# Patient Record
Sex: FEMALE | Race: WHITE | NOT HISPANIC OR LATINO | Employment: OTHER | URBAN - METROPOLITAN AREA
[De-identification: names, ages, dates, MRNs, and addresses within clinical notes are randomized per-mention and may not be internally consistent; named-entity substitution may affect disease eponyms.]

---

## 2017-02-15 RX ORDER — THYROID 60 MG
TABLET ORAL
Qty: 90 TABLET | Refills: 0 | OUTPATIENT
Start: 2017-02-15

## 2017-02-27 RX ORDER — THYROID 60 MG
TABLET ORAL
Qty: 90 TABLET | Refills: 0 | OUTPATIENT
Start: 2017-02-27

## 2017-04-03 ENCOUNTER — OFFICE VISIT (OUTPATIENT)
Dept: INTERNAL MEDICINE | Facility: CLINIC | Age: 62
End: 2017-04-03

## 2017-04-03 VITALS
OXYGEN SATURATION: 98 % | HEIGHT: 65 IN | SYSTOLIC BLOOD PRESSURE: 118 MMHG | BODY MASS INDEX: 29.4 KG/M2 | DIASTOLIC BLOOD PRESSURE: 78 MMHG | WEIGHT: 176.44 LBS | HEART RATE: 81 BPM

## 2017-04-03 DIAGNOSIS — D50.9 IRON DEFICIENCY ANEMIA, UNSPECIFIED IRON DEFICIENCY ANEMIA TYPE: ICD-10-CM

## 2017-04-03 DIAGNOSIS — B00.9 HERPES SIMPLEX: Primary | ICD-10-CM

## 2017-04-03 DIAGNOSIS — Z00.00 HEALTHCARE MAINTENANCE: ICD-10-CM

## 2017-04-03 DIAGNOSIS — E03.9 ACQUIRED HYPOTHYROIDISM: ICD-10-CM

## 2017-04-03 PROBLEM — E61.1 IRON DEFICIENCY: Status: ACTIVE | Noted: 2017-04-03

## 2017-04-03 PROBLEM — N63.0 BREAST LUMP: Status: ACTIVE | Noted: 2017-04-03

## 2017-04-03 PROBLEM — M50.30 DDD (DEGENERATIVE DISC DISEASE), CERVICAL: Status: ACTIVE | Noted: 2017-04-03

## 2017-04-03 PROBLEM — M54.10 NERVE ROOT PAIN: Status: ACTIVE | Noted: 2017-04-03

## 2017-04-03 PROBLEM — M54.2 CERVICAL PAIN: Status: ACTIVE | Noted: 2017-04-03

## 2017-04-03 PROBLEM — R22.9 LOCAL SUPERFICIAL SWELLING, MASS OR LUMP: Status: ACTIVE | Noted: 2017-04-03

## 2017-04-03 PROBLEM — J30.9 ALLERGIC RHINITIS: Status: ACTIVE | Noted: 2017-04-03

## 2017-04-03 PROBLEM — G25.81 RESTLESS LEG: Status: ACTIVE | Noted: 2017-04-03

## 2017-04-03 PROBLEM — R53.83 FATIGUE: Status: ACTIVE | Noted: 2017-04-03

## 2017-04-03 PROBLEM — E66.9 ADIPOSITY: Status: ACTIVE | Noted: 2017-04-03

## 2017-04-03 PROBLEM — E53.8 B12 DEFICIENCY: Status: ACTIVE | Noted: 2017-04-03

## 2017-04-03 PROBLEM — F41.9 ANXIETY: Status: ACTIVE | Noted: 2017-04-03

## 2017-04-03 PROBLEM — M25.519 ARTHRALGIA OF SHOULDER: Status: ACTIVE | Noted: 2017-04-03

## 2017-04-03 PROCEDURE — 99396 PREV VISIT EST AGE 40-64: CPT | Performed by: NURSE PRACTITIONER

## 2017-04-03 RX ORDER — VALACYCLOVIR HYDROCHLORIDE 1 G/1
2000 TABLET, FILM COATED ORAL EVERY 12 HOURS
Qty: 4 TABLET | Refills: 6 | Status: SHIPPED | OUTPATIENT
Start: 2017-04-03 | End: 2017-10-20 | Stop reason: SDUPTHER

## 2017-04-03 RX ORDER — ESTROGENS, ESTERIFIED AND METHYLTESTOSTERONE 1.25; .625 MG/1; MG/1
1 TABLET, COATED ORAL DAILY
Refills: 1 | COMMUNITY
Start: 2017-03-13 | End: 2017-10-05 | Stop reason: DRUGHIGH

## 2017-04-03 RX ORDER — GLUCOSAMINE HCL 500 MG
1 TABLET ORAL DAILY
COMMUNITY
End: 2019-02-13

## 2017-04-03 NOTE — PROGRESS NOTES
Subjective   Marychuy De Paz is a 61 y.o. female. Patient is here today for   Chief Complaint   Patient presents with   • Hypothyroidism     Pt here to follow up on Hypothyroidism. Pt currently takes Amour Thyroid 60mg once daily.    • Anemia     Pt here to follow up on anemia, currently taking Ferrocite 324mg once daily. Pt has been dx for the past x8 years.    .    History of Present Illness   New patient here to establish care.  Health history and questionnaire have been reviewed in its entirety. The patient's previous primary care provider was Dr. Payne.  Patient has a diagnosis of hypothyroidism. She was diagnosed about 8 years ago. She is currently on Castle Thyroid 60 mg daily.  She also has a history of anemia and has been on ferrocite 324 mg daily. States that the iron causes constipation, so she may skip taking it every once in a while.     She had a colonoscopy in 2013 by Dr Chamberlain which was normal. She needs another one in 10 years.   She sees Dr. Kaci Goodman for gynecology and had a mammogram in Dec 2016    She would also like a refill on valacyclovir. She does have occasional cold sores.     The following portions of the patient's history were reviewed and updated as appropriate: allergies, current medications, past family history, past medical history, past social history, past surgical history and problem list.    Review of Systems   Constitutional: Negative.    HENT: Negative.    Respiratory: Negative.    Cardiovascular: Negative.    Gastrointestinal: Positive for constipation. Negative for abdominal pain, diarrhea, nausea and vomiting.   Endocrine: Negative.    Genitourinary: Negative.    Musculoskeletal: Negative.    Skin: Negative.    Neurological: Negative.    Hematological: Negative.    Psychiatric/Behavioral: Negative.        Objective   Vitals:    04/03/17 1037   BP: 118/78   Pulse: 81   SpO2: 98%     Physical Exam   Constitutional: She is oriented to person, place, and time. Vital signs are  normal. She appears well-developed and well-nourished.   HENT:   Right Ear: External ear normal.   Left Ear: External ear normal.   Mouth/Throat: Oropharynx is clear and moist.   Eyes: EOM are normal. Pupils are equal, round, and reactive to light.   Neck: Normal range of motion. Neck supple. No thyromegaly present.   Cardiovascular: Normal rate, regular rhythm and normal heart sounds.    Pulmonary/Chest: Effort normal and breath sounds normal.   Abdominal: Soft. Bowel sounds are normal. She exhibits no distension. There is no splenomegaly or hepatomegaly. There is no tenderness. No hernia.   Musculoskeletal: Normal range of motion.   Neurological: She is alert and oriented to person, place, and time. She has normal reflexes.   Skin: Skin is warm and dry.   Psychiatric: She has a normal mood and affect. Her behavior is normal. Judgment and thought content normal.   Nursing note and vitals reviewed.      Assessment/Plan   Marychuy was seen today for hypothyroidism and anemia.    Diagnoses and all orders for this visit:    Herpes simplex  -     valACYclovir (VALTREX) 1000 MG tablet; Take 2 tablets by mouth Every 12 (Twelve) Hours.    Iron deficiency anemia, unspecified iron deficiency anemia type  -     CBC & Differential  -     Iron and TIBC  -     Ferritin    Acquired hypothyroidism  -     TSH  -     T4, Free    Healthcare maintenance  -     CBC & Differential  -     Comprehensive Metabolic Panel  -     Lipid Panel With / Chol / HDL Ratio  -     Vitamin D 25 Hydroxy      Will call patient with lab results. F/u in 6 months.

## 2017-04-04 ENCOUNTER — TELEPHONE (OUTPATIENT)
Dept: INTERNAL MEDICINE | Facility: CLINIC | Age: 62
End: 2017-04-04

## 2017-04-04 LAB
25(OH)D3+25(OH)D2 SERPL-MCNC: 30.3 NG/ML (ref 30–100)
ALBUMIN SERPL-MCNC: 4.3 G/DL (ref 3.5–5.2)
ALBUMIN/GLOB SERPL: 1.8 G/DL
ALP SERPL-CCNC: 53 U/L (ref 39–117)
ALT SERPL-CCNC: 19 U/L (ref 1–33)
AST SERPL-CCNC: 16 U/L (ref 1–32)
BASOPHILS # BLD AUTO: 0.02 10*3/MM3 (ref 0–0.2)
BASOPHILS NFR BLD AUTO: 0.3 % (ref 0–1.5)
BILIRUB SERPL-MCNC: 0.5 MG/DL (ref 0.1–1.2)
BUN SERPL-MCNC: 12 MG/DL (ref 6–20)
BUN/CREAT SERPL: 15.8 (ref 7–25)
CALCIUM SERPL-MCNC: 9.6 MG/DL (ref 8.6–10.5)
CHLORIDE SERPL-SCNC: 104 MMOL/L (ref 98–107)
CHOLEST SERPL-MCNC: 167 MG/DL (ref 0–200)
CHOLEST/HDLC SERPL: 3.04 {RATIO}
CO2 SERPL-SCNC: 25.9 MMOL/L (ref 22–29)
CREAT SERPL-MCNC: 0.76 MG/DL (ref 0.57–1)
EOSINOPHIL # BLD AUTO: 0.02 10*3/MM3 (ref 0–0.7)
EOSINOPHIL NFR BLD AUTO: 0.3 % (ref 0.3–6.2)
ERYTHROCYTE [DISTWIDTH] IN BLOOD BY AUTOMATED COUNT: 14.2 % (ref 11.7–13)
FERRITIN SERPL-MCNC: 32.89 NG/ML (ref 13–150)
GLOBULIN SER CALC-MCNC: 2.4 GM/DL
GLUCOSE SERPL-MCNC: 88 MG/DL (ref 65–99)
HCT VFR BLD AUTO: 48.2 % (ref 35.6–45.5)
HDLC SERPL-MCNC: 55 MG/DL (ref 40–60)
HGB BLD-MCNC: 15.3 G/DL (ref 11.9–15.5)
IMM GRANULOCYTES # BLD: 0 10*3/MM3 (ref 0–0.03)
IMM GRANULOCYTES NFR BLD: 0 % (ref 0–0.5)
IRON SATN MFR SERPL: 72 % (ref 20–50)
IRON SERPL-MCNC: 353 MCG/DL (ref 37–145)
LDLC SERPL CALC-MCNC: 96 MG/DL (ref 0–100)
LYMPHOCYTES # BLD AUTO: 1.84 10*3/MM3 (ref 0.9–4.8)
LYMPHOCYTES NFR BLD AUTO: 31.7 % (ref 19.6–45.3)
MCH RBC QN AUTO: 31.3 PG (ref 26.9–32)
MCHC RBC AUTO-ENTMCNC: 31.7 G/DL (ref 32.4–36.3)
MCV RBC AUTO: 98.6 FL (ref 80.5–98.2)
MONOCYTES # BLD AUTO: 0.66 10*3/MM3 (ref 0.2–1.2)
MONOCYTES NFR BLD AUTO: 11.4 % (ref 5–12)
NEUTROPHILS # BLD AUTO: 3.27 10*3/MM3 (ref 1.9–8.1)
NEUTROPHILS NFR BLD AUTO: 56.3 % (ref 42.7–76)
PLATELET # BLD AUTO: 305 10*3/MM3 (ref 140–500)
POTASSIUM SERPL-SCNC: 4.5 MMOL/L (ref 3.5–5.2)
PROT SERPL-MCNC: 6.7 G/DL (ref 6–8.5)
RBC # BLD AUTO: 4.89 10*6/MM3 (ref 3.9–5.2)
SODIUM SERPL-SCNC: 143 MMOL/L (ref 136–145)
T4 FREE SERPL-MCNC: 1.06 NG/DL (ref 0.93–1.7)
TIBC SERPL-MCNC: 491 MCG/DL
TRIGL SERPL-MCNC: 82 MG/DL (ref 0–150)
TSH SERPL DL<=0.005 MIU/L-ACNC: 1.09 MIU/ML (ref 0.27–4.2)
UIBC SERPL-MCNC: 138 MCG/DL
VLDLC SERPL CALC-MCNC: 16.4 MG/DL (ref 5–40)
WBC # BLD AUTO: 5.81 10*3/MM3 (ref 4.5–10.7)

## 2017-04-04 NOTE — TELEPHONE ENCOUNTER
----- Message from ASHER Coombs sent at 4/4/2017  9:05 AM EDT -----  Please let patient know that her labs are normal, except that her iron is elevated. She can stop her iron supplement and I will recheck a CBC and iron studies in 3 months.

## 2017-04-04 NOTE — TELEPHONE ENCOUNTER
Pt aware of lab results. Pt will stop her iron & call me back to check her iron levels in 3 months.

## 2017-09-12 ENCOUNTER — TRANSCRIBE ORDERS (OUTPATIENT)
Dept: ADMINISTRATIVE | Facility: HOSPITAL | Age: 62
End: 2017-09-12

## 2017-09-12 DIAGNOSIS — Z12.31 VISIT FOR SCREENING MAMMOGRAM: Primary | ICD-10-CM

## 2017-09-28 ENCOUNTER — HOSPITAL ENCOUNTER (EMERGENCY)
Facility: HOSPITAL | Age: 62
Discharge: HOME | End: 2017-09-28
Attending: EMERGENCY MEDICINE | Admitting: EMERGENCY MEDICINE

## 2017-09-28 ENCOUNTER — APPOINTMENT (OUTPATIENT)
Dept: CT IMAGING | Facility: HOSPITAL | Age: 62
End: 2017-09-28

## 2017-09-28 VITALS
BODY MASS INDEX: 29.66 KG/M2 | HEIGHT: 65 IN | WEIGHT: 178 LBS | TEMPERATURE: 98.7 F | HEART RATE: 83 BPM | RESPIRATION RATE: 16 BRPM | OXYGEN SATURATION: 98 % | DIASTOLIC BLOOD PRESSURE: 78 MMHG | SYSTOLIC BLOOD PRESSURE: 127 MMHG

## 2017-09-28 DIAGNOSIS — R10.13 EPIGASTRIC PAIN: ICD-10-CM

## 2017-09-28 DIAGNOSIS — N39.0 ACUTE UTI: Primary | ICD-10-CM

## 2017-09-28 LAB
ALBUMIN SERPL-MCNC: 4.4 G/DL (ref 3.5–5.2)
ALBUMIN/GLOB SERPL: 1.8 G/DL
ALP SERPL-CCNC: 45 U/L (ref 39–117)
ALT SERPL W P-5'-P-CCNC: 24 U/L (ref 1–33)
ANION GAP SERPL CALCULATED.3IONS-SCNC: 12.9 MMOL/L
AST SERPL-CCNC: 23 U/L (ref 1–32)
BACTERIA UR QL AUTO: ABNORMAL /HPF
BASOPHILS # BLD AUTO: 0.01 10*3/MM3 (ref 0–0.2)
BASOPHILS NFR BLD AUTO: 0.1 % (ref 0–1.5)
BILIRUB SERPL-MCNC: 0.6 MG/DL (ref 0.1–1.2)
BILIRUB UR QL STRIP: NEGATIVE
BUN BLD-MCNC: 15 MG/DL (ref 6–20)
BUN/CREAT SERPL: 18.8 (ref 7–25)
CALCIUM SPEC-SCNC: 9.1 MG/DL (ref 8.6–10.5)
CHLORIDE SERPL-SCNC: 102 MMOL/L (ref 98–107)
CLARITY UR: ABNORMAL
CO2 SERPL-SCNC: 24.1 MMOL/L (ref 22–29)
COLOR UR: YELLOW
CREAT BLD-MCNC: 0.8 MG/DL (ref 0.57–1)
DEPRECATED RDW RBC AUTO: 45.7 FL (ref 37–54)
EOSINOPHIL # BLD AUTO: 0.04 10*3/MM3 (ref 0–0.7)
EOSINOPHIL NFR BLD AUTO: 0.3 % (ref 0.3–6.2)
ERYTHROCYTE [DISTWIDTH] IN BLOOD BY AUTOMATED COUNT: 13.1 % (ref 11.7–13)
GFR SERPL CREATININE-BSD FRML MDRD: 73 ML/MIN/1.73
GLOBULIN UR ELPH-MCNC: 2.4 GM/DL
GLUCOSE BLD-MCNC: 105 MG/DL (ref 65–99)
GLUCOSE UR STRIP-MCNC: NEGATIVE MG/DL
HCT VFR BLD AUTO: 46.4 % (ref 35.6–45.5)
HGB BLD-MCNC: 15.4 G/DL (ref 11.9–15.5)
HGB UR QL STRIP.AUTO: NEGATIVE
HOLD SPECIMEN: NORMAL
HOLD SPECIMEN: NORMAL
HYALINE CASTS UR QL AUTO: ABNORMAL /LPF
IMM GRANULOCYTES # BLD: 0.05 10*3/MM3 (ref 0–0.03)
IMM GRANULOCYTES NFR BLD: 0.3 % (ref 0–0.5)
KETONES UR QL STRIP: NEGATIVE
LEUKOCYTE ESTERASE UR QL STRIP.AUTO: ABNORMAL
LIPASE SERPL-CCNC: 29 U/L (ref 13–60)
LYMPHOCYTES # BLD AUTO: 0.83 10*3/MM3 (ref 0.9–4.8)
LYMPHOCYTES NFR BLD AUTO: 5.3 % (ref 19.6–45.3)
MCH RBC QN AUTO: 32 PG (ref 26.9–32)
MCHC RBC AUTO-ENTMCNC: 33.2 G/DL (ref 32.4–36.3)
MCV RBC AUTO: 96.3 FL (ref 80.5–98.2)
MONOCYTES # BLD AUTO: 1.97 10*3/MM3 (ref 0.2–1.2)
MONOCYTES NFR BLD AUTO: 12.5 % (ref 5–12)
NEUTROPHILS # BLD AUTO: 12.86 10*3/MM3 (ref 1.9–8.1)
NEUTROPHILS NFR BLD AUTO: 81.5 % (ref 42.7–76)
NITRITE UR QL STRIP: NEGATIVE
PH UR STRIP.AUTO: 6.5 [PH] (ref 5–8)
PLATELET # BLD AUTO: 255 10*3/MM3 (ref 140–500)
PMV BLD AUTO: 9.7 FL (ref 6–12)
POTASSIUM BLD-SCNC: 4.1 MMOL/L (ref 3.5–5.2)
PROT SERPL-MCNC: 6.8 G/DL (ref 6–8.5)
PROT UR QL STRIP: NEGATIVE
RBC # BLD AUTO: 4.82 10*6/MM3 (ref 3.9–5.2)
RBC # UR: ABNORMAL /HPF
REF LAB TEST METHOD: ABNORMAL
SODIUM BLD-SCNC: 139 MMOL/L (ref 136–145)
SP GR UR STRIP: 1.02 (ref 1–1.03)
SQUAMOUS #/AREA URNS HPF: ABNORMAL /HPF
UROBILINOGEN UR QL STRIP: ABNORMAL
WBC NRBC COR # BLD: 15.76 10*3/MM3 (ref 4.5–10.7)
WBC UR QL AUTO: ABNORMAL /HPF
WHOLE BLOOD HOLD SPECIMEN: NORMAL
WHOLE BLOOD HOLD SPECIMEN: NORMAL

## 2017-09-28 PROCEDURE — 74177 CT ABD & PELVIS W/CONTRAST: CPT

## 2017-09-28 PROCEDURE — 87086 URINE CULTURE/COLONY COUNT: CPT | Performed by: EMERGENCY MEDICINE

## 2017-09-28 PROCEDURE — 96361 HYDRATE IV INFUSION ADD-ON: CPT

## 2017-09-28 PROCEDURE — 83690 ASSAY OF LIPASE: CPT | Performed by: EMERGENCY MEDICINE

## 2017-09-28 PROCEDURE — 96374 THER/PROPH/DIAG INJ IV PUSH: CPT

## 2017-09-28 PROCEDURE — 25010000002 ONDANSETRON PER 1 MG: Performed by: NURSE PRACTITIONER

## 2017-09-28 PROCEDURE — 99284 EMERGENCY DEPT VISIT MOD MDM: CPT

## 2017-09-28 PROCEDURE — 81001 URINALYSIS AUTO W/SCOPE: CPT | Performed by: EMERGENCY MEDICINE

## 2017-09-28 PROCEDURE — 85025 COMPLETE CBC W/AUTO DIFF WBC: CPT | Performed by: EMERGENCY MEDICINE

## 2017-09-28 PROCEDURE — 36415 COLL VENOUS BLD VENIPUNCTURE: CPT | Performed by: EMERGENCY MEDICINE

## 2017-09-28 PROCEDURE — 0 IOPAMIDOL 61 % SOLUTION: Performed by: NURSE PRACTITIONER

## 2017-09-28 PROCEDURE — 80053 COMPREHEN METABOLIC PANEL: CPT | Performed by: EMERGENCY MEDICINE

## 2017-09-28 RX ORDER — SULFAMETHOXAZOLE AND TRIMETHOPRIM 800; 160 MG/1; MG/1
1 TABLET ORAL 2 TIMES DAILY
Qty: 10 TABLET | Refills: 0 | Status: SHIPPED | OUTPATIENT
Start: 2017-09-28 | End: 2017-10-05 | Stop reason: DRUGHIGH

## 2017-09-28 RX ORDER — SODIUM CHLORIDE 0.9 % (FLUSH) 0.9 %
10 SYRINGE (ML) INJECTION AS NEEDED
Status: DISCONTINUED | OUTPATIENT
Start: 2017-09-28 | End: 2017-09-28 | Stop reason: HOSPADM

## 2017-09-28 RX ORDER — ONDANSETRON 2 MG/ML
4 INJECTION INTRAMUSCULAR; INTRAVENOUS ONCE
Status: COMPLETED | OUTPATIENT
Start: 2017-09-28 | End: 2017-09-28

## 2017-09-28 RX ADMIN — ONDANSETRON 4 MG: 2 INJECTION INTRAMUSCULAR; INTRAVENOUS at 17:16

## 2017-09-28 RX ADMIN — IOPAMIDOL 85 ML: 612 INJECTION, SOLUTION INTRAVENOUS at 17:33

## 2017-09-28 RX ADMIN — SODIUM CHLORIDE 1000 ML: 9 INJECTION, SOLUTION INTRAVENOUS at 17:13

## 2017-09-29 LAB
BACTERIA SPEC AEROBE CULT: NORMAL
BACTERIA SPEC AEROBE CULT: NORMAL

## 2017-10-05 ENCOUNTER — OFFICE VISIT (OUTPATIENT)
Dept: INTERNAL MEDICINE | Facility: CLINIC | Age: 62
End: 2017-10-05

## 2017-10-05 VITALS
SYSTOLIC BLOOD PRESSURE: 122 MMHG | DIASTOLIC BLOOD PRESSURE: 80 MMHG | WEIGHT: 177.44 LBS | BODY MASS INDEX: 29.56 KG/M2 | HEIGHT: 65 IN | OXYGEN SATURATION: 97 % | HEART RATE: 91 BPM

## 2017-10-05 DIAGNOSIS — E03.9 ACQUIRED HYPOTHYROIDISM: ICD-10-CM

## 2017-10-05 DIAGNOSIS — R39.15 URGENCY OF URINATION: Primary | ICD-10-CM

## 2017-10-05 LAB
BILIRUB BLD-MCNC: NEGATIVE MG/DL
CLARITY, POC: CLEAR
COLOR UR: ABNORMAL
GLUCOSE UR STRIP-MCNC: NEGATIVE MG/DL
KETONES UR QL: NEGATIVE
LEUKOCYTE EST, POC: ABNORMAL
NITRITE UR-MCNC: NEGATIVE MG/ML
PH UR: 5.5 [PH] (ref 5–8)
PROT UR STRIP-MCNC: NEGATIVE MG/DL
RBC # UR STRIP: ABNORMAL /UL
SP GR UR: 1.02 (ref 1–1.03)
UROBILINOGEN UR QL: NORMAL

## 2017-10-05 PROCEDURE — 99213 OFFICE O/P EST LOW 20 MIN: CPT | Performed by: NURSE PRACTITIONER

## 2017-10-05 PROCEDURE — 81003 URINALYSIS AUTO W/O SCOPE: CPT | Performed by: NURSE PRACTITIONER

## 2017-10-05 RX ORDER — ESTROGENS, ESTERIFIED AND METHYLTESTOSTERONE 2.5; 1.25 MG/1; MG/1
1 TABLET, COATED ORAL DAILY
Refills: 1 | COMMUNITY
Start: 2017-08-30 | End: 2020-09-08

## 2017-10-05 NOTE — PROGRESS NOTES
Subjective   Marychuy De Paz is a 61 y.o. female. Patient is here today for   Chief Complaint   Patient presents with   • Urinary Tract Infection     Pt here to follow up on ER visit for a UTI. Pt went in initiall for vomiting & mild epigastric pain.    .    History of Present Illness   Patient is here to follow up on UTI that was diagnosed at the ER on 9/28/17. Patient had presented with epigastric pain and she was concerned about her previous hiatal hernia repair.  Patient had a CT scan which was normal.  Patient's urine did show signs of an infection and her WBC count was elevated, so she was prescribed Bactrim. Patient states that she is feeling better.  Patient does have urinary frequency, but states that that is nothing new.  Patient has had 2 different bladder surgeries and sees Dr. Kaci Goodman who is recommending another surgery.  Dr. Goodman was not the one that had done her previous 2 surgeries.  Patient states that the Bactrim did cause her to have leg cramps and those have subsided since being done with the Bactrim. Denies burning with urination or dysuria.  Patient would like to have her white blood cell count rechecked and is wondering if she can have her TSH checked since it has been about 6 months since her last lab draw.    The following portions of the patient's history were reviewed and updated as appropriate: allergies, current medications, past family history, past medical history, past social history, past surgical history and problem list.    Review of Systems   Constitutional: Negative.    Respiratory: Negative.    Cardiovascular: Negative.    Gastrointestinal: Negative.    Genitourinary: Positive for frequency. Negative for dysuria and urgency.       Objective   Vitals:    10/05/17 1404   BP: 122/80   Pulse: 91   SpO2: 97%       Results for orders placed or performed in visit on 10/05/17   POCT urinalysis dipstick, automated   Result Value Ref Range    Color Dark Yellow Yellow, Straw, Dark Yellow,  Ellen    Clarity, UA Clear Clear    Glucose, UA Negative Negative, 1000 mg/dL (3+) mg/dL    Bilirubin Negative Negative    Ketones, UA Negative Negative    Specific Gravity  1.020 1.005 - 1.030    Blood, UA Trace (A) Negative    pH, Urine 5.5 5.0 - 8.0    Protein, POC Negative Negative mg/dL    Urobilinogen, UA Normal Normal    Leukocytes Small (1+) (A) Negative    Nitrite, UA Negative Negative       Physical Exam   Constitutional: Vital signs are normal. She appears well-developed and well-nourished.   Cardiovascular: Normal rate, regular rhythm and normal heart sounds.    Pulmonary/Chest: Effort normal and breath sounds normal.   Abdominal: Soft. Normal appearance and bowel sounds are normal. There is no tenderness. There is no CVA tenderness.   Skin: Skin is warm, dry and intact.   Psychiatric: She has a normal mood and affect. Her speech is normal and behavior is normal. Thought content normal.   Nursing note and vitals reviewed.      Assessment/Plan   Marychuy was seen today for urinary tract infection.    Diagnoses and all orders for this visit:    Urgency of urination  -     CBC & Differential  -     POCT urinalysis dipstick, automated  -     Urine Culture - Urine, Urine, Clean Catch    Acquired hypothyroidism  -     TSH Rfx On Abnormal To Free T4    Will wait for urine culture results prior to sending in any further antibiotics to patient.

## 2017-10-06 ENCOUNTER — TELEPHONE (OUTPATIENT)
Dept: SOCIAL WORK | Facility: HOSPITAL | Age: 62
End: 2017-10-06

## 2017-10-06 LAB
BASOPHILS # BLD AUTO: 0.01 10*3/MM3 (ref 0–0.2)
BASOPHILS NFR BLD AUTO: 0.2 % (ref 0–1.5)
EOSINOPHIL # BLD AUTO: 0.03 10*3/MM3 (ref 0–0.7)
EOSINOPHIL NFR BLD AUTO: 0.5 % (ref 0.3–6.2)
ERYTHROCYTE [DISTWIDTH] IN BLOOD BY AUTOMATED COUNT: 13.2 % (ref 11.7–13)
HCT VFR BLD AUTO: 49.1 % (ref 35.6–45.5)
HGB BLD-MCNC: 15.8 G/DL (ref 11.9–15.5)
IMM GRANULOCYTES # BLD: 0 10*3/MM3 (ref 0–0.03)
IMM GRANULOCYTES NFR BLD: 0 % (ref 0–0.5)
LYMPHOCYTES # BLD AUTO: 1.89 10*3/MM3 (ref 0.9–4.8)
LYMPHOCYTES NFR BLD AUTO: 29.6 % (ref 19.6–45.3)
MCH RBC QN AUTO: 31.7 PG (ref 26.9–32)
MCHC RBC AUTO-ENTMCNC: 32.2 G/DL (ref 32.4–36.3)
MCV RBC AUTO: 98.4 FL (ref 80.5–98.2)
MONOCYTES # BLD AUTO: 0.78 10*3/MM3 (ref 0.2–1.2)
MONOCYTES NFR BLD AUTO: 12.2 % (ref 5–12)
NEUTROPHILS # BLD AUTO: 3.67 10*3/MM3 (ref 1.9–8.1)
NEUTROPHILS NFR BLD AUTO: 57.5 % (ref 42.7–76)
PLATELET # BLD AUTO: 299 10*3/MM3 (ref 140–500)
RBC # BLD AUTO: 4.99 10*6/MM3 (ref 3.9–5.2)
TSH SERPL DL<=0.005 MIU/L-ACNC: 1.25 MIU/ML (ref 0.27–4.2)
WBC # BLD AUTO: 6.38 10*3/MM3 (ref 4.5–10.7)

## 2017-10-06 NOTE — TELEPHONE ENCOUNTER
ER F/U phone call:   Pt stated that she is doing some better. Saw her PCP on 10-5-17, another ua/ c&s completed. MD d/c'd current antibiotic (sulfa) due to severe leg cramps. (added to allergy list) PCP awaiting results of culture before ordering any other antibiotics. No other questions or concerns voiced at this time. Kaci Blake RN

## 2017-10-07 LAB
BACTERIA UR CULT: NORMAL
BACTERIA UR CULT: NORMAL

## 2017-10-09 ENCOUNTER — TELEPHONE (OUTPATIENT)
Dept: INTERNAL MEDICINE | Facility: CLINIC | Age: 62
End: 2017-10-09

## 2017-10-09 DIAGNOSIS — M54.9 BACK PAIN, UNSPECIFIED BACK LOCATION, UNSPECIFIED BACK PAIN LATERALITY, UNSPECIFIED CHRONICITY: Primary | ICD-10-CM

## 2017-10-09 NOTE — TELEPHONE ENCOUNTER
"----- Message from ASHER Coombs sent at 10/9/2017  4:36 PM EDT -----  Ok for thoracic and lumbar spine x-rays. Dx: back pain.   She had an MRI of her cervical spine 3 years ago and there was no scoliosis.     ----- Message -----     From: Cherelle Wilkinson MA     Sent: 10/9/2017   1:41 PM       To: ASHER Coombs    Pt states that she is feeling better urinary wise but she is not feeling \"right\" & thinks she has scoliosis just like her mother because she finds herself shrinking. 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 Please advise.    "

## 2017-10-10 ENCOUNTER — HOSPITAL ENCOUNTER (OUTPATIENT)
Dept: GENERAL RADIOLOGY | Facility: HOSPITAL | Age: 62
Discharge: HOME | End: 2017-10-10
Admitting: NURSE PRACTITIONER

## 2017-10-10 ENCOUNTER — HOSPITAL ENCOUNTER (OUTPATIENT)
Dept: GENERAL RADIOLOGY | Facility: HOSPITAL | Age: 62
Discharge: HOME | End: 2017-10-10

## 2017-10-10 DIAGNOSIS — M54.9 BACK PAIN, UNSPECIFIED BACK LOCATION, UNSPECIFIED BACK PAIN LATERALITY, UNSPECIFIED CHRONICITY: Primary | ICD-10-CM

## 2017-10-10 PROCEDURE — 72110 X-RAY EXAM L-2 SPINE 4/>VWS: CPT

## 2017-10-10 PROCEDURE — 72072 X-RAY EXAM THORAC SPINE 3VWS: CPT

## 2017-10-12 ENCOUNTER — TELEPHONE (OUTPATIENT)
Dept: INTERNAL MEDICINE | Facility: CLINIC | Age: 62
End: 2017-10-12

## 2017-10-12 NOTE — TELEPHONE ENCOUNTER
----- Message from ASHER Coombs sent at 10/12/2017  1:12 PM EDT -----  Please let patient know that she does have some scoliosis in her thoracic and lumbar spine. Has she seen a back doctor in the past?

## 2017-10-13 NOTE — TELEPHONE ENCOUNTER
Pt aware of x-ray results. Pt is going to get into her acupuncture dr & see if this will work for treatment.

## 2017-10-20 DIAGNOSIS — B00.9 HERPES SIMPLEX: ICD-10-CM

## 2017-10-23 RX ORDER — VALACYCLOVIR HYDROCHLORIDE 1 G/1
TABLET, FILM COATED ORAL
Qty: 4 TABLET | Refills: 0 | Status: SHIPPED | OUTPATIENT
Start: 2017-10-23 | End: 2018-05-22 | Stop reason: SDUPTHER

## 2017-10-30 ENCOUNTER — HOSPITAL ENCOUNTER (OUTPATIENT)
Dept: MAMMOGRAPHY | Facility: HOSPITAL | Age: 62
Discharge: HOME | End: 2017-10-30
Admitting: NURSE PRACTITIONER

## 2017-10-30 DIAGNOSIS — Z12.31 VISIT FOR SCREENING MAMMOGRAM: ICD-10-CM

## 2017-10-30 PROCEDURE — G0202 SCR MAMMO BI INCL CAD: HCPCS

## 2017-10-31 ENCOUNTER — TELEPHONE (OUTPATIENT)
Dept: INTERNAL MEDICINE | Facility: CLINIC | Age: 62
End: 2017-10-31

## 2017-10-31 NOTE — TELEPHONE ENCOUNTER
----- Message from ASHER Coombs sent at 10/30/2017  3:43 PM EDT -----  Please let patient know that her mammogram is normal.

## 2017-11-10 ENCOUNTER — TELEPHONE (OUTPATIENT)
Dept: INTERNAL MEDICINE | Facility: CLINIC | Age: 62
End: 2017-11-10

## 2017-11-10 DIAGNOSIS — M41.9 SCOLIOSIS OF THORACIC SPINE, UNSPECIFIED SCOLIOSIS TYPE: ICD-10-CM

## 2017-11-10 DIAGNOSIS — M41.9 SCOLIOSIS OF LUMBAR SPINE, UNSPECIFIED SCOLIOSIS TYPE: Primary | ICD-10-CM

## 2017-11-10 NOTE — TELEPHONE ENCOUNTER
Pt requesting a referral to be placed for Gurpreet Lal who is with sports medicine/physical therapy rehab for Scoliosis in the lumbar & thoracic. This has been okay'd per Linda.

## 2017-12-07 ENCOUNTER — TELEPHONE (OUTPATIENT)
Dept: INTERNAL MEDICINE | Facility: CLINIC | Age: 62
End: 2017-12-07

## 2017-12-07 NOTE — TELEPHONE ENCOUNTER
----- Message from Yoselyn White sent at 12/7/2017 12:44 PM EST -----  Pt dropped off a physical therapy paper this morning and would like you to call her back at [phone]

## 2017-12-08 DIAGNOSIS — M41.9 SCOLIOSIS OF LUMBAR SPINE, UNSPECIFIED SCOLIOSIS TYPE: Primary | ICD-10-CM

## 2017-12-08 DIAGNOSIS — M41.9 SCOLIOSIS OF THORACIC SPINE, UNSPECIFIED SCOLIOSIS TYPE: ICD-10-CM

## 2017-12-11 ENCOUNTER — HOSPITAL ENCOUNTER (OUTPATIENT)
Dept: MRI IMAGING | Facility: HOSPITAL | Age: 62
Discharge: HOME | End: 2017-12-11
Admitting: NURSE PRACTITIONER

## 2017-12-11 DIAGNOSIS — M41.9 SCOLIOSIS OF LUMBAR SPINE, UNSPECIFIED SCOLIOSIS TYPE: ICD-10-CM

## 2017-12-11 PROCEDURE — 72148 MRI LUMBAR SPINE W/O DYE: CPT

## 2017-12-13 ENCOUNTER — APPOINTMENT (OUTPATIENT)
Dept: MRI IMAGING | Facility: HOSPITAL | Age: 62
End: 2017-12-13

## 2017-12-18 ENCOUNTER — HOSPITAL ENCOUNTER (OUTPATIENT)
Dept: MRI IMAGING | Facility: HOSPITAL | Age: 62
Discharge: HOME | End: 2017-12-18
Admitting: NURSE PRACTITIONER

## 2017-12-18 DIAGNOSIS — M41.9 SCOLIOSIS OF THORACIC SPINE, UNSPECIFIED SCOLIOSIS TYPE: ICD-10-CM

## 2017-12-18 PROCEDURE — 72146 MRI CHEST SPINE W/O DYE: CPT

## 2018-02-05 ENCOUNTER — TELEPHONE (OUTPATIENT)
Dept: INTERNAL MEDICINE | Facility: CLINIC | Age: 63
End: 2018-02-05

## 2018-02-05 NOTE — TELEPHONE ENCOUNTER
LM for patient to return my call regarding progesterone 100mg  Once nightly refill request. Linda has not prescribed this before.

## 2018-02-16 ENCOUNTER — TELEPHONE (OUTPATIENT)
Dept: INTERNAL MEDICINE | Facility: CLINIC | Age: 63
End: 2018-02-16

## 2018-02-16 DIAGNOSIS — M51.36 BULGING LUMBAR DISC: ICD-10-CM

## 2018-02-16 DIAGNOSIS — M51.36 DDD (DEGENERATIVE DISC DISEASE), LUMBAR: Primary | ICD-10-CM

## 2018-02-16 NOTE — TELEPHONE ENCOUNTER
----- Message from ASHER Coombs sent at 2/16/2018  8:39 AM EST -----  I would recommend neurosurgery. I think she had wanted to go to Dr. Scales     ----- Message -----     From: Cherelle Wilkinson MA     Sent: 2/15/2018   3:10 PM       To: ASHER Coombs    Pt called & complains of having lower back pain & she thinks that this is related to her DDD & disc buldge. She is wanting to know what type of specialist we can refer her to. Would this be ortho or pain?

## 2018-02-21 RX ORDER — THYROID 60 MG
TABLET ORAL
Qty: 90 TABLET | Refills: 0 | Status: SHIPPED | OUTPATIENT
Start: 2018-02-21 | End: 2018-05-25 | Stop reason: SDUPTHER

## 2018-03-28 ENCOUNTER — OFFICE VISIT (OUTPATIENT)
Dept: NEUROSURGERY | Facility: CLINIC | Age: 63
End: 2018-03-28

## 2018-03-28 VITALS
BODY MASS INDEX: 30.59 KG/M2 | DIASTOLIC BLOOD PRESSURE: 92 MMHG | SYSTOLIC BLOOD PRESSURE: 120 MMHG | WEIGHT: 183.6 LBS | HEIGHT: 65 IN | HEART RATE: 84 BPM

## 2018-03-28 DIAGNOSIS — M51.37 DEGENERATION OF LUMBAR OR LUMBOSACRAL INTERVERTEBRAL DISC: Primary | ICD-10-CM

## 2018-03-28 PROBLEM — M51.379 DEGENERATION OF LUMBAR OR LUMBOSACRAL INTERVERTEBRAL DISC: Status: ACTIVE | Noted: 2018-03-28

## 2018-03-28 PROCEDURE — 99243 OFF/OP CNSLTJ NEW/EST LOW 30: CPT | Performed by: PHYSICIAN ASSISTANT

## 2018-03-28 RX ORDER — SYRINGE WITH NEEDLE, 1 ML 28GX1/2"
SYRINGE, EMPTY DISPOSABLE MISCELLANEOUS
Refills: 2 | COMMUNITY
Start: 2018-02-27 | End: 2019-02-13

## 2018-03-28 RX ORDER — EPINEPHRINE 0.3 MG/.3ML
INJECTION SUBCUTANEOUS
Refills: 0 | COMMUNITY
Start: 2018-02-01

## 2018-03-28 RX ORDER — CHLORCYCLIZINE HYDROCHLORIDE AND PSEUDOEPHEDRINE HYDROCHLORIDE 25; 60 MG/1; MG/1
TABLET ORAL
Refills: 5 | COMMUNITY
Start: 2018-03-07 | End: 2019-02-13

## 2018-03-28 NOTE — PROGRESS NOTES
Subjective   Patient ID: Marychuy De Paz is a 62 y.o. female is being seen for consultation today at the request of ASHER Coombs  For back and buttock pain.  She states she had a fall 12 years ago landing on her buttock.  She completed PT over the past year which made the pain worse. Mrs. De Paz takes Ibuprofen 600 mg PRN for pain.     Back Pain   This is a chronic problem. The current episode started more than 1 year ago. The problem occurs constantly. The problem has been gradually worsening since onset. The pain is present in the sacro-iliac and lumbar spine (L>R). The quality of the pain is described as aching. The pain does not radiate. The pain is at a severity of 5/10. The pain is the same all the time. The symptoms are aggravated by sitting. Associated symptoms include numbness and tingling. Pertinent negatives include no bladder incontinence, bowel incontinence, leg pain, perianal numbness or weakness. Risk factors include sedentary lifestyle. She has tried NSAIDs and home exercises (PT ) for the symptoms. The treatment provided mild (made pain worse ) relief.       The following portions of the patient's history were reviewed and updated as appropriate: allergies, current medications, past family history, past medical history, past social history, past surgical history and problem list.    Review of Systems   Constitutional: Positive for activity change and unexpected weight change.   Gastrointestinal: Positive for rectal pain. Negative for bowel incontinence.   Genitourinary: Negative for bladder incontinence and difficulty urinating.   Musculoskeletal: Positive for arthralgias and back pain.   Allergic/Immunologic: Positive for environmental allergies.   Neurological: Positive for tingling and numbness. Negative for weakness.   Psychiatric/Behavioral: Positive for sleep disturbance.   All other systems reviewed and are negative.      Objective   Physical Exam   Constitutional: She is oriented to  person, place, and time. She appears well-developed and well-nourished.   HENT:   Head: Normocephalic and atraumatic.   Right Ear: External ear normal.   Left Ear: External ear normal.   Eyes: Conjunctivae and EOM are normal. Pupils are equal, round, and reactive to light. Right eye exhibits no discharge. Left eye exhibits no discharge.   Neck: Normal range of motion. Neck supple. No tracheal deviation present.   Cardiovascular: Intact distal pulses.    Pulmonary/Chest: Effort normal. No stridor. No respiratory distress.   Musculoskeletal: Normal range of motion. She exhibits no edema, tenderness or deformity.   Neurological: She is alert and oriented to person, place, and time. She has normal strength and normal reflexes. She displays no atrophy, no tremor and normal reflexes. No cranial nerve deficit or sensory deficit. She exhibits normal muscle tone. She displays a negative Romberg sign. She displays no seizure activity. Coordination and gait normal.   No long tract signs   Skin: Skin is warm and dry.   Psychiatric: She has a normal mood and affect. Her behavior is normal. Judgment and thought content normal.   Nursing note and vitals reviewed.    Neurologic Exam     Mental Status   Oriented to person, place, and time.     Cranial Nerves     CN III, IV, VI   Pupils are equal, round, and reactive to light.  Extraocular motions are normal.     Motor Exam     Strength   Strength 5/5 throughout.       Assessment/Plan   Independent Review of Radiographic Studies:    I did review the brain MRI December 2017.  The thoracic MRI shows a left sided disc bulge at T6-T7 without significant stenosis.  The lumbar MRI does show multilevel degenerative disc disease with dextroscoliosis with the apex at L3-L4.  There is multilevel facet arthropathy at L3-L4 L4-L5 L5-S1 with a degenerative anterolisthesis at L5-S1.  No severe stenosis or severe nerve compression.  Medical Decision Making:    Ms. De Paz was referred to us by Ms.  ASHER Castillo for a chronic history of back pain.  The patient states that she has had pain for the last 15 years or so.  It radiates from the low back into the upper buttock no leg pain.  She has chronic numbness or tingling in her feet and chronic urinary dribbling related to previous bladder repair.  The back pain is described as a deep ache that is stabbing at times.  It is localized to the SI joints.  Again it often radiates into the upper buttock but the back pain is the primary location of discomfort.  It is constant and moderate to severe.  She denies any particular exacerbating or alleviating factors.  She went to physical therapy but it actually made her pain quite a bit worse.  She has never had injections or back surgery before.    Her exam does not reveal any deficits.    Did review the MRI findings with her.  I explained that I certainly do not appreciate any surgical indications.  I do think she would benefit from pain management and possibly either facet injections or SI injections.  I will refer her to Dr. Dangelo.  She will call if she begins experiencing more radicular symptoms or if the pain fails to improve or at any time if Dr. Dangelo feels that reevaluation would be appropriate.  Marychuy was seen today for back pain and numbness.    Diagnoses and all orders for this visit:    Degeneration of lumbar or lumbosacral intervertebral disc  -     Ambulatory Referral to Pain Management      Return if symptoms worsen or fail to improve.

## 2018-04-02 ENCOUNTER — TELEPHONE (OUTPATIENT)
Dept: NEUROSURGERY | Facility: CLINIC | Age: 63
End: 2018-04-02

## 2018-04-02 NOTE — TELEPHONE ENCOUNTER
Patient called about changing her appointment to [facility]? I explained to her that Priscilla wanted her to see a pain management physician and they will treat her with injections based on her symptoms whereas if she went to [facility] pain management we have to give the order. She states she just felt more comfortable with the providers over there but understands why Priscilla is sending her to Dr. Dangelo and will keep her appointment with them.

## 2018-04-10 ENCOUNTER — OFFICE VISIT (OUTPATIENT)
Dept: INTERNAL MEDICINE | Facility: CLINIC | Age: 63
End: 2018-04-10

## 2018-04-10 VITALS
WEIGHT: 181 LBS | DIASTOLIC BLOOD PRESSURE: 80 MMHG | SYSTOLIC BLOOD PRESSURE: 122 MMHG | TEMPERATURE: 98.7 F | BODY MASS INDEX: 30.16 KG/M2 | HEIGHT: 65 IN | HEART RATE: 81 BPM | OXYGEN SATURATION: 98 %

## 2018-04-10 DIAGNOSIS — R30.0 DYSURIA: Primary | ICD-10-CM

## 2018-04-10 DIAGNOSIS — J06.9 ACUTE URI: ICD-10-CM

## 2018-04-10 LAB
BILIRUB BLD-MCNC: NEGATIVE MG/DL
CLARITY, POC: ABNORMAL
COLOR UR: ABNORMAL
GLUCOSE UR STRIP-MCNC: NEGATIVE MG/DL
KETONES UR QL: NEGATIVE
LEUKOCYTE EST, POC: ABNORMAL
NITRITE UR-MCNC: POSITIVE MG/ML
PH UR: 7 [PH] (ref 5–8)
PROT UR STRIP-MCNC: NEGATIVE MG/DL
RBC # UR STRIP: ABNORMAL /UL
SP GR UR: 1.02 (ref 1–1.03)
UROBILINOGEN UR QL: NORMAL

## 2018-04-10 PROCEDURE — 99214 OFFICE O/P EST MOD 30 MIN: CPT | Performed by: NURSE PRACTITIONER

## 2018-04-10 PROCEDURE — 81003 URINALYSIS AUTO W/O SCOPE: CPT | Performed by: NURSE PRACTITIONER

## 2018-04-10 RX ORDER — LEVOFLOXACIN 500 MG/1
500 TABLET, FILM COATED ORAL DAILY
Qty: 10 TABLET | Refills: 0 | Status: SHIPPED | OUTPATIENT
Start: 2018-04-10 | End: 2018-11-13

## 2018-04-10 NOTE — PROGRESS NOTES
Subjective   Marychuy De Paz is a 62 y.o. female. Patient is here today for   Chief Complaint   Patient presents with   • Urinary Tract Infection     Pt complains of having urine frequency, lower abdominal discomfort & dysuria x2-3 days.    • Sore Throat     Pt complains of having a ST, HA, right ear pain. Pt was tested for influenza & strep yesterday; both negative.    .    History of Present Illness   New problems to this provider: C/o urinary frequency x 3 days associated with pelvic pain/pressure, dysuria. She had some caffeine which may have started her symptoms. She has tried Azo and increasing her water intake with no help.   C/o sore throat, headache, right ear pain also. She has gargled warm salt water. She was tested for flu and strep which were negative. She has had a low grade fever. Tylenol has helped her fever.     The following portions of the patient's history were reviewed and updated as appropriate: allergies, current medications, past family history, past medical history, past social history, past surgical history and problem list.    Review of Systems   Constitutional: Positive for fever.   HENT: Positive for ear pain and sore throat.    Respiratory: Negative.    Cardiovascular: Negative.    Genitourinary: Positive for dysuria, frequency and pelvic pain.   Neurological: Positive for headaches.       Objective   Vitals:    04/10/18 1028   BP: 122/80   Pulse: 81   Temp: 98.7 °F (37.1 °C)   SpO2: 98%     POCT urine - positive for nitrites, leukocytes, and blood    Physical Exam   Constitutional: Vital signs are normal. She appears well-developed and well-nourished.   HENT:   Right Ear: Tympanic membrane and ear canal normal.   Left Ear: Tympanic membrane and ear canal normal.   Mouth/Throat: Mucous membranes are normal. Posterior oropharyngeal erythema present.   Cardiovascular: Normal rate, regular rhythm and normal heart sounds.    Pulmonary/Chest: Effort normal and breath sounds normal.   Abdominal:  There is no CVA tenderness.   Lymphadenopathy:     She has no cervical adenopathy.   Skin: Skin is warm, dry and intact.   Psychiatric: She has a normal mood and affect. Her speech is normal and behavior is normal. Thought content normal.   Nursing note and vitals reviewed.      Assessment/Plan   Marychuy was seen today for urinary tract infection and sore throat.    Diagnoses and all orders for this visit:    Dysuria  -     levoFLOXacin (LEVAQUIN) 500 MG tablet; Take 1 tablet by mouth Daily.  -     Urine Culture - Urine, Urine, Clean Catch  -     POCT urinalysis dipstick, automated    Acute URI  -     levoFLOXacin (LEVAQUIN) 500 MG tablet; Take 1 tablet by mouth Daily.        Patient would like to be tested for BRCA2 with her next lab work. She has a positive family history of this.   She takes prometrium for menopause, but it also helps with sleep.

## 2018-04-13 ENCOUNTER — OFFICE VISIT (OUTPATIENT)
Dept: PAIN MEDICINE | Facility: CLINIC | Age: 63
End: 2018-04-13

## 2018-04-13 VITALS
HEIGHT: 65 IN | OXYGEN SATURATION: 99 % | DIASTOLIC BLOOD PRESSURE: 91 MMHG | RESPIRATION RATE: 16 BRPM | SYSTOLIC BLOOD PRESSURE: 125 MMHG | HEART RATE: 111 BPM | TEMPERATURE: 98.2 F | WEIGHT: 180.8 LBS | BODY MASS INDEX: 30.12 KG/M2

## 2018-04-13 DIAGNOSIS — M43.06 LUMBAR SPONDYLOLYSIS: Primary | ICD-10-CM

## 2018-04-13 DIAGNOSIS — G89.29 CHRONIC BILATERAL LOW BACK PAIN WITHOUT SCIATICA: ICD-10-CM

## 2018-04-13 DIAGNOSIS — M54.50 CHRONIC BILATERAL LOW BACK PAIN WITHOUT SCIATICA: ICD-10-CM

## 2018-04-13 LAB
POC AMPHETAMINES: NEGATIVE
POC BARBITURATES: NEGATIVE
POC BENZODIAZEPHINES: NEGATIVE
POC COCAINE: NEGATIVE
POC METHADONE: NEGATIVE
POC METHAMPHETAMINE SCREEN URINE: NEGATIVE
POC OPIATES: POSITIVE
POC OXYCODONE: NEGATIVE
POC PHENCYCLIDINE: NEGATIVE
POC PROPOXYPHENE: NEGATIVE
POC THC: NEGATIVE
POC TRICYCLIC ANTIDEPRESSANTS: NEGATIVE

## 2018-04-13 PROCEDURE — 80305 DRUG TEST PRSMV DIR OPT OBS: CPT | Performed by: PAIN MEDICINE

## 2018-04-13 PROCEDURE — 99204 OFFICE O/P NEW MOD 45 MIN: CPT | Performed by: PAIN MEDICINE
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
## 2018-04-14 LAB
BACTERIA UR CULT: ABNORMAL
BACTERIA UR CULT: ABNORMAL
OTHER ANTIBIOTIC SUSC ISLT: ABNORMAL

## 2018-04-18 ENCOUNTER — RESULTS ENCOUNTER (OUTPATIENT)
Dept: PAIN MEDICINE | Facility: CLINIC | Age: 63
End: 2018-04-18

## 2018-04-18 DIAGNOSIS — M54.50 CHRONIC BILATERAL LOW BACK PAIN WITHOUT SCIATICA: ICD-10-CM

## 2018-04-18 DIAGNOSIS — M43.06 LUMBAR SPONDYLOLYSIS: ICD-10-CM

## 2018-04-18 DIAGNOSIS — G89.29 CHRONIC BILATERAL LOW BACK PAIN WITHOUT SCIATICA: ICD-10-CM

## 2018-04-19 DIAGNOSIS — E53.8 B12 DEFICIENCY: ICD-10-CM

## 2018-04-19 DIAGNOSIS — R73.01 ELEVATED FASTING GLUCOSE: ICD-10-CM

## 2018-04-19 DIAGNOSIS — D50.9 IRON DEFICIENCY ANEMIA, UNSPECIFIED IRON DEFICIENCY ANEMIA TYPE: Primary | ICD-10-CM

## 2018-04-19 DIAGNOSIS — E55.9 VITAMIN D DEFICIENCY: ICD-10-CM

## 2018-04-19 DIAGNOSIS — Z84.81 FHX: BRCA GENE POSITIVE: ICD-10-CM

## 2018-04-19 DIAGNOSIS — E03.9 ACQUIRED HYPOTHYROIDISM: ICD-10-CM

## 2018-04-19 DIAGNOSIS — Z00.00 HEALTHCARE MAINTENANCE: ICD-10-CM

## 2018-04-20 ENCOUNTER — TELEPHONE (OUTPATIENT)
Dept: INTERNAL MEDICINE | Facility: CLINIC | Age: 63
End: 2018-04-20

## 2018-04-20 RX ORDER — AMOXICILLIN AND CLAVULANATE POTASSIUM 875; 125 MG/1; MG/1
1 TABLET, FILM COATED ORAL 2 TIMES DAILY
Qty: 20 TABLET | Refills: 0 | Status: SHIPPED | OUTPATIENT
Start: 2018-04-20 | End: 2018-11-13

## 2018-04-20 NOTE — TELEPHONE ENCOUNTER
----- Message from ASHER Coombs sent at 2018  8:52 AM EDT -----  Regarding: RE: Not much better  Okay to send in augmentin 875 mg po bid x 10 days.    ----- Message -----  From: Cherelle Wilkinson MA  Sent: 2018   1:13 PM  To: ASHER Coombs  Subject: FW: Not much better                              Please advise.     ----- Message -----  From: Marychuy De Paz RRT  Sent: 2018  12:51 PM  To: Cherelle Wilkinson MA  Subject: Not much better                                  Just a note to let Felipa know that I am finished with all the antibiotic, but I am not much better.  Is there anything she can recommend for me to try that will help with sinus pressure and cough.  I have been using mucinex DM every 4 hours along with my Stahist AD.  I have a family  tomorrow and I feel like asking them if I can fit in the casket too.  Sorry to bother you all.  I know you are busy just like we are.    Marychuy De Paz

## 2018-04-23 ENCOUNTER — TELEPHONE (OUTPATIENT)
Dept: INTERNAL MEDICINE | Facility: CLINIC | Age: 63
End: 2018-04-23

## 2018-04-23 NOTE — TELEPHONE ENCOUNTER
Rep from [facility] contact our office for any other diagnosis for the BRCA testing that was ordered. I let the rep know that there was not other than the one that was provided by the patient & if this is not covered by insurance, the patient will need to be billed since she is the one who had requested this testing to be done.

## 2018-04-23 NOTE — TELEPHONE ENCOUNTER
----- Message from Guerita Oneill sent at 4/23/2018 11:52 AM EDT -----  Regarding: LAB [facility] DX CODE  [facility] called asking for a diagnosis code. Call them back at [phone] and use Ref# [id number]. Thanks

## 2018-05-16 ENCOUNTER — OUTSIDE FACILITY SERVICE (OUTPATIENT)
Dept: PAIN MEDICINE | Facility: CLINIC | Age: 63
End: 2018-05-16

## 2018-05-16 ENCOUNTER — DOCUMENTATION (OUTPATIENT)
Dept: PAIN MEDICINE | Facility: CLINIC | Age: 63
End: 2018-05-16

## 2018-05-16 PROCEDURE — 64495 INJ PARAVERT F JNT L/S 3 LEV: CPT | Performed by: PAIN MEDICINE

## 2018-05-16 PROCEDURE — 64493 INJ PARAVERT F JNT L/S 1 LEV: CPT | Performed by: PAIN MEDICINE

## 2018-05-16 PROCEDURE — 64494 INJ PARAVERT F JNT L/S 2 LEV: CPT | Performed by: PAIN MEDICINE

## 2018-05-16 NOTE — PROGRESS NOTES
Bilateral L2-5 Lumbar Medial Branch Blockade  [facility]    PREOPERATIVE DIAGNOSIS:  Lumbar spondylosis without myelopathy    POSTOPERATIVE DIAGNOSIS:  Lumbar spondylosis without myelopathy    PROCEDURE:   Diagnostic Bilateral Lumbar Medial Branch Nerve Blockades, with fluoroscopy:  L2, L3, L4, and L5 nerves (at the L3, L4, L5 transverse processes and the sacral alar groove) to block facet joints L3-4, L4-5, and L5-S1  1. 62212-26 -- Bilateral Lumbar Facet blocks, 1st Level  2. 59073-69 -- Bilateral Lumbar Facet blocks, 2nd  Level  3. 63274-70 -- Bilateral Lumbar Facet blocks, 3rd Level    PRE-PROCEDURE DISCUSSION WITH PATIENT:    Risks and complications were discussed with the patient prior to starting the procedure and informed consent was obtained.      SURGEON:  Lizzeth Dangelo MD    REASON FOR PROCEDURE:    Diagnostic injection at this level is needed and Tenderness to palpation of these affected joints is noted on exam under fluoroscopy.      SEDATION:  Patient declined administration of moderate sedation    ANESTHETIC:  Marcaine 0.25%  STEROID:  Methylprednisolone (DEPO MEDROL) 80mg/ml  TOTAL VOLUME OF SOLUTION: 8ml    DESCRIPTON OF PROCEDURE:  After obtaining informed consent, IV access was obtained in the preoperative area.   The patient was taken to the operating room.  The patient was placed in the prone position with a pillow under the abdomen. All pressure points were well padded.  EKG, blood pressure, and pulse oximeter were monitored.  The patient was monitored and sedated by the RN under my direction. The lumbosacral area was prepped with Chloraprep and draped in a sterile fashion. Under fluoroscopic guidance the transverse processes of the L3, L4, and L5 vertebrae at the junctions of the superior articular processes were identified on the right. Also identified was the groove between the ala and the superior articular process of the sacrum on the ipsilateral side.  Skin and  subcutaneous tissue were anesthetized with 1% lidocaine above each of these points. A 22-gauge spinal needle was introduced under fluoroscopic guidance at the above junctions. Aspiration was negative for blood and CSF.  After confirming the position of the needle with fluoroscope in all views,  1 mL of the anesthetic solution noted above was injected at each of these points.  Needles were removed intact from each of the areas.  A similar procedure was repeated to block the L2, L3, L4, and L5 nerves on the contralateral side.   Onset of analgesia was noted.  Vital signs remained stable throughout.      ESTIMATED BLOOD LOSS:  <5 mL  SPECIMENS:  none    COMPLICATIONS:   No complications were noted.    TOLERANCE & DISCHARGE CONDITION:    The patient tolerated the procedure well.  The patient was transported to the recovery area without difficulties.  The patient was discharged to home under the care of family in stable and satisfactory condition.    PLAN OF CARE:  1. The patient was given our standard instruction sheet.  2. We discussed that Lumbar Medial Branch Blockade is a diagnostic procedure in consideration for radiofrequency ablation if two diagnostic procedures prove to be positive for significant benefit.  If sustained relief of 6 to eight weeks is obtained, then an alternative plan could be therapeutic lumbar branch blockades.  3. The patient is asked to keep a pain log each hour for 8 hours after the procedure today.  4. The patient will  Repeat injection 4 wks.  5. The patient will resume all medications as per the medication reconciliation sheet.

## 2018-05-18 ENCOUNTER — TELEPHONE (OUTPATIENT)
Dept: INTERNAL MEDICINE | Facility: CLINIC | Age: 63
End: 2018-05-18

## 2018-05-18 NOTE — TELEPHONE ENCOUNTER
Pt aware of normal labs. BRCA lab results are still not resulted per [facility].     Pt aware that we diagnosed the BRCA lab test under BRCA gene positive due to family history of breast cancer. Pt is wanting Linda to resubmit this test again through [facility] & Linda will not. Pt is aware of this & has discussed this with Guerita Xavier.

## 2018-05-22 DIAGNOSIS — B00.9 HERPES SIMPLEX: ICD-10-CM

## 2018-05-22 RX ORDER — VALACYCLOVIR HYDROCHLORIDE 1 G/1
TABLET, FILM COATED ORAL
Qty: 4 TABLET | Refills: 0 | Status: SHIPPED | OUTPATIENT
Start: 2018-05-22 | End: 2019-02-13

## 2018-05-25 RX ORDER — THYROID 60 MG
TABLET ORAL
Qty: 90 TABLET | Refills: 1 | Status: SHIPPED | OUTPATIENT
Start: 2018-05-25 | End: 2018-11-13 | Stop reason: SDUPTHER

## 2018-05-29 LAB
25(OH)D3+25(OH)D2 SERPL-MCNC: 32.5 NG/ML (ref 30–100)
ALBUMIN SERPL-MCNC: 4.3 G/DL (ref 3.5–5.2)
ALBUMIN/GLOB SERPL: 1.7 G/DL
ALP SERPL-CCNC: 43 U/L (ref 39–117)
ALT SERPL-CCNC: 22 U/L (ref 1–33)
AST SERPL-CCNC: 20 U/L (ref 1–32)
BASOPHILS # BLD AUTO: 0.01 10*3/MM3 (ref 0–0.2)
BASOPHILS NFR BLD AUTO: 0.1 % (ref 0–1.5)
BILIRUB SERPL-MCNC: 0.5 MG/DL (ref 0.1–1.2)
BRCA1+BRCA2 MUT ANL BLD/T: NORMAL
BUN SERPL-MCNC: 16 MG/DL (ref 8–23)
BUN/CREAT SERPL: 17.6 (ref 7–25)
CALCIUM SERPL-MCNC: 9.6 MG/DL (ref 8.6–10.5)
CHLORIDE SERPL-SCNC: 101 MMOL/L (ref 98–107)
CHOLEST SERPL-MCNC: 167 MG/DL (ref 0–200)
CHOLEST/HDLC SERPL: 4.18 {RATIO}
CO2 SERPL-SCNC: 24 MMOL/L (ref 22–29)
CREAT SERPL-MCNC: 0.91 MG/DL (ref 0.57–1)
EOSINOPHIL # BLD AUTO: 0.04 10*3/MM3 (ref 0–0.7)
EOSINOPHIL NFR BLD AUTO: 0.6 % (ref 0.3–6.2)
ERYTHROCYTE [DISTWIDTH] IN BLOOD BY AUTOMATED COUNT: 13 % (ref 11.7–13)
GFR SERPLBLD CREATININE-BSD FMLA CKD-EPI: 63 ML/MIN/1.73
GFR SERPLBLD CREATININE-BSD FMLA CKD-EPI: 76 ML/MIN/1.73
GLOBULIN SER CALC-MCNC: 2.5 GM/DL
GLUCOSE SERPL-MCNC: 96 MG/DL (ref 65–99)
HBA1C MFR BLD: 5.3 % (ref 4.8–5.6)
HCT VFR BLD AUTO: 46.8 % (ref 35.6–45.5)
HDLC SERPL-MCNC: 40 MG/DL (ref 40–60)
HGB BLD-MCNC: 15.3 G/DL (ref 11.9–15.5)
IMM GRANULOCYTES # BLD: 0.02 10*3/MM3 (ref 0–0.03)
IMM GRANULOCYTES NFR BLD: 0.3 % (ref 0–0.5)
LAB DIRECTOR NAME PROVIDER: NORMAL
LDLC SERPL CALC-MCNC: 112 MG/DL (ref 0–100)
LYMPHOCYTES # BLD AUTO: 1.8 10*3/MM3 (ref 0.9–4.8)
LYMPHOCYTES NFR BLD AUTO: 24.9 % (ref 19.6–45.3)
MCH RBC QN AUTO: 31.9 PG (ref 26.9–32)
MCHC RBC AUTO-ENTMCNC: 32.7 G/DL (ref 32.4–36.3)
MCV RBC AUTO: 97.7 FL (ref 80.5–98.2)
MONOCYTES # BLD AUTO: 0.75 10*3/MM3 (ref 0.2–1.2)
MONOCYTES NFR BLD AUTO: 10.4 % (ref 5–12)
NEUTROPHILS # BLD AUTO: 4.6 10*3/MM3 (ref 1.9–8.1)
NEUTROPHILS NFR BLD AUTO: 63.7 % (ref 42.7–76)
PLATELET # BLD AUTO: 257 10*3/MM3 (ref 140–500)
POTASSIUM SERPL-SCNC: 4.2 MMOL/L (ref 3.5–5.2)
PREAUTHORIZATION: NORMAL
PROT SERPL-MCNC: 6.8 G/DL (ref 6–8.5)
RBC # BLD AUTO: 4.79 10*6/MM3 (ref 3.9–5.2)
SODIUM SERPL-SCNC: 141 MMOL/L (ref 136–145)
SPECIMEN PREPARATION: NORMAL
SPECIMEN SOURCE: NORMAL
TRIGL SERPL-MCNC: 74 MG/DL (ref 0–150)
TSH SERPL DL<=0.005 MIU/L-ACNC: 1.22 MIU/ML (ref 0.27–4.2)
VIT B12 SERPL-MCNC: 503 PG/ML (ref 211–946)
VLDLC SERPL CALC-MCNC: 14.8 MG/DL (ref 5–40)
WBC # BLD AUTO: 7.22 10*3/MM3 (ref 4.5–10.7)

## 2018-11-13 ENCOUNTER — OFFICE VISIT (OUTPATIENT)
Dept: INTERNAL MEDICINE | Facility: CLINIC | Age: 63
End: 2018-11-13

## 2018-11-13 VITALS
WEIGHT: 179.19 LBS | HEART RATE: 92 BPM | BODY MASS INDEX: 29.85 KG/M2 | SYSTOLIC BLOOD PRESSURE: 148 MMHG | OXYGEN SATURATION: 99 % | DIASTOLIC BLOOD PRESSURE: 100 MMHG | HEIGHT: 65 IN

## 2018-11-13 DIAGNOSIS — E03.9 ACQUIRED HYPOTHYROIDISM: Primary | ICD-10-CM

## 2018-11-13 PROCEDURE — 99213 OFFICE O/P EST LOW 20 MIN: CPT | Performed by: NURSE PRACTITIONER

## 2018-11-13 RX ORDER — LEVOTHYROXINE AND LIOTHYRONINE 38; 9 UG/1; UG/1
60 TABLET ORAL DAILY
Qty: 90 TABLET | Refills: 3 | Status: SHIPPED | OUTPATIENT
Start: 2018-11-13 | End: 2019-05-24 | Stop reason: ALTCHOICE

## 2018-11-13 NOTE — PROGRESS NOTES
Subjective   Marychuy De Paz is a 62 y.o. female. Patient is here today for   Chief Complaint   Patient presents with   • Med Refill     Pt here to follow up HRT.    • Hypothyroidism   .    History of Present Illness   Patient is here to follow up on HRT. She is needing a refill on progesterone. It helps her to sleep better with her PMS symptoms.     Patient is also here to follow up on her thyroid. Denies any problems at this time.     The following portions of the patient's history were reviewed and updated as appropriate: allergies, current medications, past family history, past medical history, past social history, past surgical history and problem list.    Review of Systems   Constitutional: Negative.    Respiratory: Negative.    Cardiovascular: Negative.    Endocrine: Negative.    Psychiatric/Behavioral: Negative.        Objective   Vitals:    11/13/18 1607   BP: 148/100   Pulse: 92   SpO2: 99%     Physical Exam   Constitutional: Vital signs are normal. She appears well-developed and well-nourished.   Neck: No thyroid mass and no thyromegaly present.   Cardiovascular: Normal rate, regular rhythm and normal heart sounds.   Pulmonary/Chest: Effort normal and breath sounds normal.   Skin: Skin is warm, dry and intact.   Psychiatric: She has a normal mood and affect. Her speech is normal and behavior is normal. Thought content normal.   Nursing note and vitals reviewed.      Assessment/Plan   Marychuy was seen today for med refill and hypothyroidism.    Diagnoses and all orders for this visit:    Acquired hypothyroidism  -     Thyroid (ARMOUR THYROID) 60 MG PO tablet; Take 1 tablet by mouth Daily.    Other orders  -     progesterone (PROMETRIUM) 100 MG capsule; Take 1 capsule by mouth Daily.      Patient will f/u in one year for a physical and labs.

## 2018-11-19 RX ORDER — THYROID 60 MG
TABLET ORAL
Qty: 90 TABLET | Refills: 0 | OUTPATIENT
Start: 2018-11-19

## 2018-11-29 ENCOUNTER — TRANSCRIBE ORDERS (OUTPATIENT)
Dept: ADMINISTRATIVE | Facility: HOSPITAL | Age: 63
End: 2018-11-29

## 2018-11-29 ENCOUNTER — HOSPITAL ENCOUNTER (OUTPATIENT)
Dept: MAMMOGRAPHY | Facility: HOSPITAL | Age: 63
Discharge: HOME | End: 2018-11-29
Admitting: NURSE PRACTITIONER

## 2018-11-29 DIAGNOSIS — Z12.31 VISIT FOR SCREENING MAMMOGRAM: Primary | ICD-10-CM

## 2018-11-29 DIAGNOSIS — Z12.31 VISIT FOR SCREENING MAMMOGRAM: ICD-10-CM

## 2018-11-29 PROCEDURE — 77067 SCR MAMMO BI INCL CAD: CPT

## 2019-01-29 ENCOUNTER — PREP FOR SURGERY (OUTPATIENT)
Dept: OTHER | Facility: HOSPITAL | Age: 64
End: 2019-01-29

## 2019-01-29 DIAGNOSIS — Z86.010 HISTORY OF COLONIC POLYPS: Primary | ICD-10-CM

## 2019-02-05 PROBLEM — T83.711A EROSION OF IMPLANTED VAGINAL MESH TO SURROUNDING ORGAN OR TISSUE (HCC): Status: ACTIVE | Noted: 2019-02-05

## 2019-02-05 PROBLEM — N81.11 CYSTOCELE, MIDLINE: Status: ACTIVE | Noted: 2019-02-05

## 2019-02-05 PROBLEM — R15.9 FECAL INCONTINENCE: Status: ACTIVE | Noted: 2019-02-05

## 2019-02-05 PROBLEM — N81.6 RECTOCELE: Status: ACTIVE | Noted: 2019-02-05

## 2019-02-05 PROBLEM — N39.3 FEMALE STRESS INCONTINENCE: Status: ACTIVE | Noted: 2019-02-05

## 2019-02-05 PROBLEM — R15.0 INCOMPLETE DEFECATION: Status: ACTIVE | Noted: 2019-02-05

## 2019-02-05 PROBLEM — N81.5 VAGINAL ENTEROCELE, CONGENITAL OR ACQUIRED: Status: ACTIVE | Noted: 2019-02-05

## 2019-02-05 PROBLEM — R39.14 FEELING OF INCOMPLETE BLADDER EMPTYING: Status: ACTIVE | Noted: 2019-02-05

## 2019-02-13 ENCOUNTER — APPOINTMENT (OUTPATIENT)
Dept: PREADMISSION TESTING | Facility: HOSPITAL | Age: 64
End: 2019-02-13

## 2019-02-13 VITALS
TEMPERATURE: 97.4 F | SYSTOLIC BLOOD PRESSURE: 138 MMHG | HEART RATE: 69 BPM | BODY MASS INDEX: 30.87 KG/M2 | HEIGHT: 65 IN | WEIGHT: 185.3 LBS | RESPIRATION RATE: 16 BRPM | DIASTOLIC BLOOD PRESSURE: 96 MMHG | OXYGEN SATURATION: 99 %

## 2019-02-13 LAB
ALBUMIN SERPL-MCNC: 4.1 G/DL (ref 3.5–5.2)
ALBUMIN/GLOB SERPL: 1.5 G/DL
ALP SERPL-CCNC: 42 U/L (ref 39–117)
ALT SERPL W P-5'-P-CCNC: 26 U/L (ref 1–33)
ANION GAP SERPL CALCULATED.3IONS-SCNC: 10.1 MMOL/L
AST SERPL-CCNC: 21 U/L (ref 1–32)
BILIRUB SERPL-MCNC: 0.3 MG/DL (ref 0.1–1.2)
BUN BLD-MCNC: 16 MG/DL (ref 8–23)
BUN/CREAT SERPL: 18.8 (ref 7–25)
CALCIUM SPEC-SCNC: 9.2 MG/DL (ref 8.6–10.5)
CHLORIDE SERPL-SCNC: 103 MMOL/L (ref 98–107)
CO2 SERPL-SCNC: 25.9 MMOL/L (ref 22–29)
CREAT BLD-MCNC: 0.85 MG/DL (ref 0.57–1)
DEPRECATED RDW RBC AUTO: 45.1 FL (ref 37–54)
ERYTHROCYTE [DISTWIDTH] IN BLOOD BY AUTOMATED COUNT: 13 % (ref 12.3–15.4)
GFR SERPL CREATININE-BSD FRML MDRD: 68 ML/MIN/1.73
GLOBULIN UR ELPH-MCNC: 2.7 GM/DL
GLUCOSE BLD-MCNC: 91 MG/DL (ref 65–99)
HCT VFR BLD AUTO: 48.1 % (ref 34–46.6)
HGB BLD-MCNC: 15.4 G/DL (ref 12–15.9)
MCH RBC QN AUTO: 30.4 PG (ref 26.6–33)
MCHC RBC AUTO-ENTMCNC: 32 G/DL (ref 31.5–35.7)
MCV RBC AUTO: 95.1 FL (ref 79–97)
PLATELET # BLD AUTO: 207 10*3/MM3 (ref 140–450)
PMV BLD AUTO: 10.2 FL (ref 6–12)
POTASSIUM BLD-SCNC: 4.2 MMOL/L (ref 3.5–5.2)
PROT SERPL-MCNC: 6.8 G/DL (ref 6–8.5)
RBC # BLD AUTO: 5.06 10*6/MM3 (ref 3.77–5.28)
SODIUM BLD-SCNC: 139 MMOL/L (ref 136–145)
WBC NRBC COR # BLD: 5.48 10*3/MM3 (ref 3.4–10.8)

## 2019-02-13 PROCEDURE — 85027 COMPLETE CBC AUTOMATED: CPT | Performed by: OBSTETRICS & GYNECOLOGY

## 2019-02-13 PROCEDURE — 80053 COMPREHEN METABOLIC PANEL: CPT | Performed by: OBSTETRICS & GYNECOLOGY

## 2019-02-13 PROCEDURE — 93005 ELECTROCARDIOGRAM TRACING: CPT

## 2019-02-13 PROCEDURE — 36415 COLL VENOUS BLD VENIPUNCTURE: CPT

## 2019-02-13 PROCEDURE — 93010 ELECTROCARDIOGRAM REPORT: CPT | Performed by: INTERNAL MEDICINE

## 2019-02-13 RX ORDER — CLOBETASOL PROPIONATE 0.5 MG/G
1 OINTMENT TOPICAL 2 TIMES WEEKLY
COMMUNITY
End: 2020-09-08

## 2019-02-13 RX ORDER — UBIDECARENONE 100 MG
100 CAPSULE ORAL DAILY
COMMUNITY
End: 2022-04-11

## 2019-02-13 RX ORDER — VALACYCLOVIR HYDROCHLORIDE 500 MG/1
1000 TABLET, FILM COATED ORAL AS NEEDED
COMMUNITY
End: 2021-04-09 | Stop reason: SDUPTHER

## 2019-02-13 NOTE — DISCHARGE INSTRUCTIONS
PLEASE ARRIVE AT 1030 AM ON 2/22/2019      Take the following medications the morning of surgery with a small sip of water:        General Instructions:  • Do not eat solid food after midnight the night before surgery.  • You may drink clear liquids day of surgery but must stop at least one hour before your hospital arrival time. **STOP FLUIDS AT 9:30 AM DAY OF SURGERY**  • It is beneficial for you to have a clear drink that contains carbohydrates the day of surgery.  We suggest a 12 to 20 ounce bottle of Gatorade or Powerade for non-diabetic patients or a 12 to 20 ounce bottle of G2 or Powerade Zero for diabetic patients. (Pediatric patients, are not advised to drink a 12 to 20 ounce carbohydrate drink)    Clear liquids are liquids you can see through.  Nothing red in color.     Plain water                               Sports drinks  Sodas                                   Gelatin (Jell-O)  Fruit juices without pulp such as white grape juice and apple juice  Popsicles that contain no fruit or yogurt  Tea or coffee (no cream or milk added)  Gatorade / Powerade  G2 / Powerade Zero    • Infants may have breast milk up to four hours before surgery.  • Infants drinking formula may drink formula up to six hours before surgery.   • Patients who avoid smoking, chewing tobacco and alcohol for 4 weeks prior to surgery have a reduced risk of post-operative complications.  Quit smoking as many days before surgery as you can.  • Do not smoke, use chewing tobacco or drink alcohol the day of surgery.   • If applicable bring your C-PAP/ BI-PAP machine.  • Bring any papers given to you in the doctor’s office.  • Wear clean comfortable clothes and socks.  • Do not wear contact lenses or make-up.  Bring a case for your glasses.   • Bring crutches or walker if applicable.  • Remove all piercings.  Leave jewelry and any other valuables at home.  • Hair extensions with metal clips must be removed prior to surgery.  • The Pre-Admission  Testing nurse will instruct you to bring medications if unable to obtain an accurate list in Pre-Admission Testing.          Preventing a Surgical Site Infection:  • For 2 to 3 days before surgery, avoid shaving with a razor because the razor can irritate skin and make it easier to develop an infection.    • Any areas of open skin can increase the risk of a post-operative wound infection by allowing bacteria to enter and travel throughout the body.  Notify your surgeon if you have any skin wounds / rashes even if it is not near the expected surgical site.  The area will need assessed to determine if surgery should be delayed until it is healed.  • The night prior to surgery sleep in a clean bed with clean clothing.  Do not allow pets to sleep with you.  • Shower on the morning of surgery using a fresh bar of anti-bacterial soap (such as Dial) and clean washcloth.  Dry with a clean towel and dress in clean clothing.  • Ask your surgeon if you will be receiving antibiotics prior to surgery.  • Make sure you, your family, and all healthcare providers clean their hands with soap and water or an alcohol based hand  before caring for you or your wound.    Day of surgery:  Upon arrival, a Pre-op nurse and Anesthesiologist will review your health history, obtain vital signs, and answer questions you may have.  The only belongings needed at this time will be your home medications and if applicable your C-PAP/BI-PAP machine.  If you are staying overnight your family can leave the rest of your belongings in the car and bring them to your room later.  A Pre-op nurse will start an IV and you may receive medication in preparation for surgery, including something to help you relax.  Your family will be able to see you in the Pre-op area.  While you are in surgery your family should notify the waiting room  if they leave the waiting room area and provide a contact phone number.    Please be aware that surgery  does come with discomfort.  We want to make every effort to control your discomfort so please discuss any uncontrolled symptoms with your nurse.   Your doctor will most likely have prescribed pain medications.      If you are going home after surgery you will receive individualized written care instructions before being discharged.  A responsible adult must drive you to and from the hospital on the day of your surgery and stay with you for 24 hours.    If you are staying overnight following surgery, you will be transported to your hospital room following the recovery period.  [facility] has all private rooms.    You have received a list of surgical assistants for your reference.  If you have any questions please call Pre-Admission Testing at [phone].  Deductibles and co-payments are collected on the day of service. Please be prepared to pay the required co-pay, deductible or deposit on the day of service as defined by your plan.

## 2019-02-22 ENCOUNTER — HOSPITAL ENCOUNTER (OUTPATIENT)
Facility: HOSPITAL | Age: 64
Discharge: HOME | End: 2019-02-23
Attending: OBSTETRICS & GYNECOLOGY | Admitting: OBSTETRICS & GYNECOLOGY

## 2019-02-22 ENCOUNTER — ANESTHESIA (OUTPATIENT)
Dept: PERIOP | Facility: HOSPITAL | Age: 64
End: 2019-02-22

## 2019-02-22 ENCOUNTER — ANESTHESIA EVENT (OUTPATIENT)
Dept: PERIOP | Facility: HOSPITAL | Age: 64
End: 2019-02-22

## 2019-02-22 DIAGNOSIS — N81.6 RECTOCELE: ICD-10-CM

## 2019-02-22 LAB
ABO GROUP BLD: NORMAL
BLD GP AB SCN SERPL QL: NEGATIVE
RH BLD: POSITIVE
T&S EXPIRATION DATE: NORMAL

## 2019-02-22 PROCEDURE — 25010000002 PHENYLEPHRINE PER 1 ML: Performed by: NURSE ANESTHETIST, CERTIFIED REGISTERED

## 2019-02-22 PROCEDURE — 25010000002 PROPOFOL 10 MG/ML EMULSION: Performed by: NURSE ANESTHETIST, CERTIFIED REGISTERED

## 2019-02-22 PROCEDURE — 25010000002 DEXAMETHASONE PER 1 MG: Performed by: NURSE ANESTHETIST, CERTIFIED REGISTERED

## 2019-02-22 PROCEDURE — 25010000002 FENTANYL CITRATE (PF) 100 MCG/2ML SOLUTION: Performed by: NURSE ANESTHETIST, CERTIFIED REGISTERED

## 2019-02-22 PROCEDURE — 86901 BLOOD TYPING SEROLOGIC RH(D): CPT | Performed by: OBSTETRICS & GYNECOLOGY

## 2019-02-22 PROCEDURE — 86900 BLOOD TYPING SEROLOGIC ABO: CPT | Performed by: OBSTETRICS & GYNECOLOGY

## 2019-02-22 PROCEDURE — 86850 RBC ANTIBODY SCREEN: CPT | Performed by: OBSTETRICS & GYNECOLOGY

## 2019-02-22 PROCEDURE — 25010000003 CEFAZOLIN IN DEXTROSE 2-4 GM/100ML-% SOLUTION: Performed by: OBSTETRICS & GYNECOLOGY

## 2019-02-22 PROCEDURE — 88300 SURGICAL PATH GROSS: CPT | Performed by: OBSTETRICS & GYNECOLOGY

## 2019-02-22 PROCEDURE — 25010000002 KETOROLAC TROMETHAMINE PER 15 MG: Performed by: ANESTHESIOLOGY

## 2019-02-22 PROCEDURE — G0378 HOSPITAL OBSERVATION PER HR: HCPCS

## 2019-02-22 PROCEDURE — 25010000002 MIDAZOLAM PER 1 MG: Performed by: ANESTHESIOLOGY

## 2019-02-22 PROCEDURE — 25010000002 ONDANSETRON PER 1 MG: Performed by: ANESTHESIOLOGY

## 2019-02-22 DEVICE — GRFT TISS STRATTICE FIRM 6X10CM: Type: IMPLANTABLE DEVICE | Site: VAGINA | Status: FUNCTIONAL

## 2019-02-22 RX ORDER — ROCURONIUM BROMIDE 10 MG/ML
INJECTION, SOLUTION INTRAVENOUS AS NEEDED
Status: DISCONTINUED | OUTPATIENT
Start: 2019-02-22 | End: 2019-02-22 | Stop reason: SURG

## 2019-02-22 RX ORDER — DEXAMETHASONE SODIUM PHOSPHATE 10 MG/ML
INJECTION INTRAMUSCULAR; INTRAVENOUS AS NEEDED
Status: DISCONTINUED | OUTPATIENT
Start: 2019-02-22 | End: 2019-02-22 | Stop reason: SURG

## 2019-02-22 RX ORDER — PROMETHAZINE HYDROCHLORIDE 25 MG/ML
12.5 INJECTION, SOLUTION INTRAMUSCULAR; INTRAVENOUS ONCE AS NEEDED
Status: DISCONTINUED | OUTPATIENT
Start: 2019-02-22 | End: 2019-02-22 | Stop reason: HOSPADM

## 2019-02-22 RX ORDER — MORPHINE SULFATE 2 MG/ML
4 INJECTION, SOLUTION INTRAMUSCULAR; INTRAVENOUS
Status: DISCONTINUED | OUTPATIENT
Start: 2019-02-22 | End: 2019-02-23 | Stop reason: HOSPADM

## 2019-02-22 RX ORDER — OXYCODONE HYDROCHLORIDE AND ACETAMINOPHEN 5; 325 MG/1; MG/1
1 TABLET ORAL EVERY 4 HOURS PRN
Status: DISCONTINUED | OUTPATIENT
Start: 2019-02-22 | End: 2019-02-23 | Stop reason: HOSPADM

## 2019-02-22 RX ORDER — FENTANYL CITRATE 50 UG/ML
INJECTION, SOLUTION INTRAMUSCULAR; INTRAVENOUS AS NEEDED
Status: DISCONTINUED | OUTPATIENT
Start: 2019-02-22 | End: 2019-02-22 | Stop reason: SURG

## 2019-02-22 RX ORDER — PROMETHAZINE HYDROCHLORIDE 25 MG/1
25 SUPPOSITORY RECTAL ONCE AS NEEDED
Status: DISCONTINUED | OUTPATIENT
Start: 2019-02-22 | End: 2019-02-22 | Stop reason: HOSPADM

## 2019-02-22 RX ORDER — DOCUSATE SODIUM 100 MG/1
100 CAPSULE, LIQUID FILLED ORAL 2 TIMES DAILY
Status: DISCONTINUED | OUTPATIENT
Start: 2019-02-22 | End: 2019-02-23 | Stop reason: HOSPADM

## 2019-02-22 RX ORDER — HYDROMORPHONE HYDROCHLORIDE 1 MG/ML
0.5 INJECTION, SOLUTION INTRAMUSCULAR; INTRAVENOUS; SUBCUTANEOUS
Status: DISCONTINUED | OUTPATIENT
Start: 2019-02-22 | End: 2019-02-22 | Stop reason: HOSPADM

## 2019-02-22 RX ORDER — FLUMAZENIL 0.1 MG/ML
0.2 INJECTION INTRAVENOUS AS NEEDED
Status: DISCONTINUED | OUTPATIENT
Start: 2019-02-22 | End: 2019-02-22 | Stop reason: HOSPADM

## 2019-02-22 RX ORDER — LEVOTHYROXINE AND LIOTHYRONINE 38; 9 UG/1; UG/1
60 TABLET ORAL DAILY
Status: DISCONTINUED | OUTPATIENT
Start: 2019-02-23 | End: 2019-02-23 | Stop reason: HOSPADM

## 2019-02-22 RX ORDER — CEFAZOLIN SODIUM 2 G/100ML
2 INJECTION, SOLUTION INTRAVENOUS
Status: DISCONTINUED | OUTPATIENT
Start: 2019-02-22 | End: 2019-02-22 | Stop reason: HOSPADM

## 2019-02-22 RX ORDER — OXYCODONE AND ACETAMINOPHEN 7.5; 325 MG/1; MG/1
1 TABLET ORAL ONCE AS NEEDED
Status: DISCONTINUED | OUTPATIENT
Start: 2019-02-22 | End: 2019-02-22 | Stop reason: HOSPADM

## 2019-02-22 RX ORDER — CEFAZOLIN SODIUM 2 G/100ML
2 INJECTION, SOLUTION INTRAVENOUS EVERY 8 HOURS
Status: COMPLETED | OUTPATIENT
Start: 2019-02-22 | End: 2019-02-23

## 2019-02-22 RX ORDER — EPHEDRINE SULFATE 50 MG/ML
5 INJECTION, SOLUTION INTRAVENOUS ONCE AS NEEDED
Status: DISCONTINUED | OUTPATIENT
Start: 2019-02-22 | End: 2019-02-22 | Stop reason: HOSPADM

## 2019-02-22 RX ORDER — ONDANSETRON 2 MG/ML
4 INJECTION INTRAMUSCULAR; INTRAVENOUS ONCE AS NEEDED
Status: DISCONTINUED | OUTPATIENT
Start: 2019-02-22 | End: 2019-02-22 | Stop reason: HOSPADM

## 2019-02-22 RX ORDER — LIDOCAINE HYDROCHLORIDE 10 MG/ML
0.5 INJECTION, SOLUTION EPIDURAL; INFILTRATION; INTRACAUDAL; PERINEURAL ONCE AS NEEDED
Status: DISCONTINUED | OUTPATIENT
Start: 2019-02-22 | End: 2019-02-22 | Stop reason: HOSPADM

## 2019-02-22 RX ORDER — MAGNESIUM HYDROXIDE 1200 MG/15ML
LIQUID ORAL AS NEEDED
Status: DISCONTINUED | OUTPATIENT
Start: 2019-02-22 | End: 2019-02-22 | Stop reason: HOSPADM

## 2019-02-22 RX ORDER — HYDROCODONE BITARTRATE AND ACETAMINOPHEN 7.5; 325 MG/1; MG/1
1 TABLET ORAL ONCE AS NEEDED
Status: DISCONTINUED | OUTPATIENT
Start: 2019-02-22 | End: 2019-02-22 | Stop reason: HOSPADM

## 2019-02-22 RX ORDER — MEPERIDINE HYDROCHLORIDE 25 MG/ML
12.5 INJECTION INTRAMUSCULAR; INTRAVENOUS; SUBCUTANEOUS
Status: DISCONTINUED | OUTPATIENT
Start: 2019-02-22 | End: 2019-02-22 | Stop reason: HOSPADM

## 2019-02-22 RX ORDER — EPHEDRINE SULFATE 50 MG/ML
INJECTION, SOLUTION INTRAVENOUS AS NEEDED
Status: DISCONTINUED | OUTPATIENT
Start: 2019-02-22 | End: 2019-02-22 | Stop reason: SURG

## 2019-02-22 RX ORDER — FENTANYL CITRATE 50 UG/ML
50 INJECTION, SOLUTION INTRAMUSCULAR; INTRAVENOUS
Status: DISCONTINUED | OUTPATIENT
Start: 2019-02-22 | End: 2019-02-22 | Stop reason: HOSPADM

## 2019-02-22 RX ORDER — HYDRALAZINE HYDROCHLORIDE 20 MG/ML
5 INJECTION INTRAMUSCULAR; INTRAVENOUS
Status: DISCONTINUED | OUTPATIENT
Start: 2019-02-22 | End: 2019-02-22 | Stop reason: HOSPADM

## 2019-02-22 RX ORDER — ESTRADIOL 0.1 MG/G
CREAM VAGINAL AS NEEDED
Status: DISCONTINUED | OUTPATIENT
Start: 2019-02-22 | End: 2019-02-22 | Stop reason: HOSPADM

## 2019-02-22 RX ORDER — DEXTROSE AND SODIUM CHLORIDE 5; .45 G/100ML; G/100ML
100 INJECTION, SOLUTION INTRAVENOUS CONTINUOUS
Status: DISCONTINUED | OUTPATIENT
Start: 2019-02-22 | End: 2019-02-23 | Stop reason: HOSPADM

## 2019-02-22 RX ORDER — ACETAMINOPHEN 500 MG
1000 TABLET ORAL ONCE
Status: COMPLETED | OUTPATIENT
Start: 2019-02-22 | End: 2019-02-22

## 2019-02-22 RX ORDER — ONDANSETRON 2 MG/ML
INJECTION INTRAMUSCULAR; INTRAVENOUS AS NEEDED
Status: DISCONTINUED | OUTPATIENT
Start: 2019-02-22 | End: 2019-02-22 | Stop reason: SURG

## 2019-02-22 RX ORDER — DIPHENHYDRAMINE HYDROCHLORIDE 50 MG/ML
12.5 INJECTION INTRAMUSCULAR; INTRAVENOUS
Status: DISCONTINUED | OUTPATIENT
Start: 2019-02-22 | End: 2019-02-22 | Stop reason: HOSPADM

## 2019-02-22 RX ORDER — DIPHENHYDRAMINE HCL 25 MG
25 CAPSULE ORAL
Status: DISCONTINUED | OUTPATIENT
Start: 2019-02-22 | End: 2019-02-22 | Stop reason: HOSPADM

## 2019-02-22 RX ORDER — LIDOCAINE HYDROCHLORIDE 20 MG/ML
INJECTION, SOLUTION INFILTRATION; PERINEURAL AS NEEDED
Status: DISCONTINUED | OUTPATIENT
Start: 2019-02-22 | End: 2019-02-22 | Stop reason: SURG

## 2019-02-22 RX ORDER — ESTERIFIED ESTROGEN AND METHYLTESTOSTERONE 1.25; 2.5 MG/1; MG/1
1 TABLET ORAL DAILY
Status: DISCONTINUED | OUTPATIENT
Start: 2019-02-23 | End: 2019-02-23 | Stop reason: HOSPADM

## 2019-02-22 RX ORDER — KETOROLAC TROMETHAMINE 30 MG/ML
INJECTION, SOLUTION INTRAMUSCULAR; INTRAVENOUS AS NEEDED
Status: DISCONTINUED | OUTPATIENT
Start: 2019-02-22 | End: 2019-02-22 | Stop reason: SURG

## 2019-02-22 RX ORDER — IBUPROFEN 400 MG/1
400 TABLET ORAL
Status: DISCONTINUED | OUTPATIENT
Start: 2019-02-22 | End: 2019-02-23 | Stop reason: HOSPADM

## 2019-02-22 RX ORDER — NALOXONE HCL 0.4 MG/ML
0.4 VIAL (ML) INJECTION
Status: DISCONTINUED | OUTPATIENT
Start: 2019-02-22 | End: 2019-02-23 | Stop reason: HOSPADM

## 2019-02-22 RX ORDER — SODIUM CHLORIDE, SODIUM LACTATE, POTASSIUM CHLORIDE, CALCIUM CHLORIDE 600; 310; 30; 20 MG/100ML; MG/100ML; MG/100ML; MG/100ML
9 INJECTION, SOLUTION INTRAVENOUS CONTINUOUS
Status: DISCONTINUED | OUTPATIENT
Start: 2019-02-22 | End: 2019-02-23 | Stop reason: HOSPADM

## 2019-02-22 RX ORDER — SODIUM CHLORIDE 0.9 % (FLUSH) 0.9 %
3-10 SYRINGE (ML) INJECTION AS NEEDED
Status: DISCONTINUED | OUTPATIENT
Start: 2019-02-22 | End: 2019-02-22 | Stop reason: HOSPADM

## 2019-02-22 RX ORDER — MIDAZOLAM HYDROCHLORIDE 1 MG/ML
2 INJECTION INTRAMUSCULAR; INTRAVENOUS
Status: DISCONTINUED | OUTPATIENT
Start: 2019-02-22 | End: 2019-02-22 | Stop reason: HOSPADM

## 2019-02-22 RX ORDER — SODIUM CHLORIDE 0.9 % (FLUSH) 0.9 %
1-10 SYRINGE (ML) INJECTION AS NEEDED
Status: DISCONTINUED | OUTPATIENT
Start: 2019-02-22 | End: 2019-02-22 | Stop reason: HOSPADM

## 2019-02-22 RX ORDER — PHENAZOPYRIDINE HYDROCHLORIDE 200 MG/1
200 TABLET, FILM COATED ORAL ONCE
Status: COMPLETED | OUTPATIENT
Start: 2019-02-22 | End: 2019-02-22

## 2019-02-22 RX ORDER — PROPOFOL 10 MG/ML
VIAL (ML) INTRAVENOUS AS NEEDED
Status: DISCONTINUED | OUTPATIENT
Start: 2019-02-22 | End: 2019-02-22 | Stop reason: SURG

## 2019-02-22 RX ORDER — FAMOTIDINE 10 MG/ML
20 INJECTION, SOLUTION INTRAVENOUS ONCE
Status: COMPLETED | OUTPATIENT
Start: 2019-02-22 | End: 2019-02-22

## 2019-02-22 RX ORDER — ALBUTEROL SULFATE 2.5 MG/3ML
2.5 SOLUTION RESPIRATORY (INHALATION) ONCE AS NEEDED
Status: DISCONTINUED | OUTPATIENT
Start: 2019-02-22 | End: 2019-02-22 | Stop reason: HOSPADM

## 2019-02-22 RX ORDER — MIDAZOLAM HYDROCHLORIDE 1 MG/ML
1 INJECTION INTRAMUSCULAR; INTRAVENOUS
Status: DISCONTINUED | OUTPATIENT
Start: 2019-02-22 | End: 2019-02-22 | Stop reason: HOSPADM

## 2019-02-22 RX ORDER — ACETAMINOPHEN 325 MG/1
650 TABLET ORAL ONCE AS NEEDED
Status: DISCONTINUED | OUTPATIENT
Start: 2019-02-22 | End: 2019-02-22 | Stop reason: HOSPADM

## 2019-02-22 RX ORDER — SODIUM CHLORIDE 0.9 % (FLUSH) 0.9 %
3 SYRINGE (ML) INJECTION EVERY 12 HOURS SCHEDULED
Status: DISCONTINUED | OUTPATIENT
Start: 2019-02-22 | End: 2019-02-22 | Stop reason: HOSPADM

## 2019-02-22 RX ORDER — LABETALOL HYDROCHLORIDE 5 MG/ML
5 INJECTION, SOLUTION INTRAVENOUS
Status: DISCONTINUED | OUTPATIENT
Start: 2019-02-22 | End: 2019-02-22 | Stop reason: HOSPADM

## 2019-02-22 RX ORDER — BUPIVACAINE HYDROCHLORIDE AND EPINEPHRINE 2.5; 5 MG/ML; UG/ML
INJECTION, SOLUTION EPIDURAL; INFILTRATION; INTRACAUDAL; PERINEURAL AS NEEDED
Status: DISCONTINUED | OUTPATIENT
Start: 2019-02-22 | End: 2019-02-22 | Stop reason: HOSPADM

## 2019-02-22 RX ORDER — SODIUM CHLORIDE, SODIUM LACTATE, POTASSIUM CHLORIDE, CALCIUM CHLORIDE 600; 310; 30; 20 MG/100ML; MG/100ML; MG/100ML; MG/100ML
100 INJECTION, SOLUTION INTRAVENOUS CONTINUOUS
Status: DISCONTINUED | OUTPATIENT
Start: 2019-02-22 | End: 2019-02-23 | Stop reason: HOSPADM

## 2019-02-22 RX ORDER — SCOLOPAMINE TRANSDERMAL SYSTEM 1 MG/1
1 PATCH, EXTENDED RELEASE TRANSDERMAL
Status: DISCONTINUED | OUTPATIENT
Start: 2019-02-22 | End: 2019-02-22 | Stop reason: HOSPADM

## 2019-02-22 RX ORDER — NALOXONE HCL 0.4 MG/ML
0.2 VIAL (ML) INJECTION AS NEEDED
Status: DISCONTINUED | OUTPATIENT
Start: 2019-02-22 | End: 2019-02-22 | Stop reason: HOSPADM

## 2019-02-22 RX ORDER — PROMETHAZINE HYDROCHLORIDE 25 MG/1
25 TABLET ORAL ONCE AS NEEDED
Status: DISCONTINUED | OUTPATIENT
Start: 2019-02-22 | End: 2019-02-22 | Stop reason: HOSPADM

## 2019-02-22 RX ADMIN — EPHEDRINE SULFATE 5 MG: 50 INJECTION INTRAMUSCULAR; INTRAVENOUS; SUBCUTANEOUS at 16:17

## 2019-02-22 RX ADMIN — ONDANSETRON 4 MG: 2 INJECTION INTRAMUSCULAR; INTRAVENOUS at 18:13

## 2019-02-22 RX ADMIN — DEXTROSE AND SODIUM CHLORIDE 100 ML/HR: 5; 450 INJECTION, SOLUTION INTRAVENOUS at 22:03

## 2019-02-22 RX ADMIN — KETOROLAC TROMETHAMINE 30 MG: 30 INJECTION, SOLUTION INTRAMUSCULAR; INTRAVENOUS at 18:13

## 2019-02-22 RX ADMIN — FENTANYL CITRATE 50 MCG: 50 INJECTION, SOLUTION INTRAMUSCULAR; INTRAVENOUS at 18:56

## 2019-02-22 RX ADMIN — DEXAMETHASONE SODIUM PHOSPHATE 8 MG: 10 INJECTION INTRAMUSCULAR; INTRAVENOUS at 15:30

## 2019-02-22 RX ADMIN — CEFAZOLIN SODIUM 2 G: 2 INJECTION, SOLUTION INTRAVENOUS at 14:43

## 2019-02-22 RX ADMIN — FENTANYL CITRATE 50 MCG: 50 INJECTION, SOLUTION INTRAMUSCULAR; INTRAVENOUS at 19:08

## 2019-02-22 RX ADMIN — ACETAMINOPHEN 1000 MG: 500 TABLET, FILM COATED ORAL at 13:55

## 2019-02-22 RX ADMIN — LIDOCAINE HYDROCHLORIDE 100 MG: 20 INJECTION, SOLUTION INFILTRATION; PERINEURAL at 14:50

## 2019-02-22 RX ADMIN — FAMOTIDINE 20 MG: 10 INJECTION INTRAVENOUS at 12:18

## 2019-02-22 RX ADMIN — CEFAZOLIN SODIUM 2 G: 2 INJECTION, SOLUTION INTRAVENOUS at 23:11

## 2019-02-22 RX ADMIN — SODIUM CHLORIDE, POTASSIUM CHLORIDE, SODIUM LACTATE AND CALCIUM CHLORIDE: 600; 310; 30; 20 INJECTION, SOLUTION INTRAVENOUS at 16:51

## 2019-02-22 RX ADMIN — PROPOFOL 200 MG: 10 INJECTION, EMULSION INTRAVENOUS at 14:50

## 2019-02-22 RX ADMIN — SODIUM CHLORIDE, POTASSIUM CHLORIDE, SODIUM LACTATE AND CALCIUM CHLORIDE 9 ML/HR: 600; 310; 30; 20 INJECTION, SOLUTION INTRAVENOUS at 12:17

## 2019-02-22 RX ADMIN — PHENAZOPYRIDINE 200 MG: 200 TABLET ORAL at 11:18

## 2019-02-22 RX ADMIN — SODIUM CHLORIDE 12 MCG/HR: 900 INJECTION, SOLUTION INTRAVENOUS at 14:55

## 2019-02-22 RX ADMIN — PHENYLEPHRINE HYDROCHLORIDE 100 MCG: 10 INJECTION INTRAVENOUS at 15:57

## 2019-02-22 RX ADMIN — SCOPALAMINE 1 PATCH: 1 PATCH, EXTENDED RELEASE TRANSDERMAL at 12:17

## 2019-02-22 RX ADMIN — FENTANYL CITRATE 100 MCG: 50 INJECTION INTRAMUSCULAR; INTRAVENOUS at 14:50

## 2019-02-22 RX ADMIN — SUGAMMADEX 1 MG: 100 INJECTION, SOLUTION INTRAVENOUS at 18:35

## 2019-02-22 RX ADMIN — EPHEDRINE SULFATE 5 MG: 50 INJECTION INTRAMUSCULAR; INTRAVENOUS; SUBCUTANEOUS at 15:41

## 2019-02-22 RX ADMIN — MIDAZOLAM 2 MG: 1 INJECTION INTRAMUSCULAR; INTRAVENOUS at 12:18

## 2019-02-22 RX ADMIN — ROCURONIUM BROMIDE 50 MG: 10 INJECTION INTRAVENOUS at 14:50

## 2019-02-22 NOTE — ANESTHESIA PREPROCEDURE EVALUATION
Anesthesia Evaluation     Patient summary reviewed and Nursing notes reviewed                Airway   Mallampati: II  No difficulty expected  Dental      Pulmonary - negative pulmonary ROS   Cardiovascular - negative cardio ROS    ECG reviewed  Rhythm: regular  Rate: normal        Neuro/Psych  (+) psychiatric history Anxiety,     GI/Hepatic/Renal/Endo    (+)  hiatal hernia,  hypothyroidism,     Musculoskeletal     (+) neck pain,   Abdominal    Substance History - negative use     OB/GYN negative ob/gyn ROS         Other   (+) arthritis                     Anesthesia Plan    ASA 2     general     intravenous induction   Anesthetic plan, all risks, benefits, and alternatives have been provided, discussed and informed consent has been obtained with: patient.

## 2019-02-22 NOTE — ANESTHESIA PROCEDURE NOTES
Airway  Urgency: elective    Airway not difficult    General Information and Staff    Patient location during procedure: OR  Anesthesiologist: Anurag Villagran MD  CRNA: Leisa Whitlock CRNA    Indications and Patient Condition  Indications for airway management: airway protection    Preoxygenated: yes  MILS not maintained throughout  Mask difficulty assessment: 1 - vent by mask    Final Airway Details  Final airway type: endotracheal airway      Successful airway: ETT  Cuffed: yes   Successful intubation technique: direct laryngoscopy  Facilitating devices/methods: intubating stylet  Endotracheal tube insertion site: oral  Blade: Ari  Blade size: 3  ETT size (mm): 7.0  Cormack-Lehane Classification: grade I - full view of glottis  Placement verified by: chest auscultation   Cuff volume (mL): 8  Measured from: lips  ETT to lips (cm): 21  Number of attempts at approach: 1    Additional Comments  PreO2 100% face mask, IV induction, easy mask, DVL x1, cords noted, tube through, cuff up, EBBSH, +etCO2, = chest movement, tube secured in place, atraumatic, teeth and lips intact as preop.

## 2019-02-23 VITALS
WEIGHT: 180.13 LBS | OXYGEN SATURATION: 95 % | DIASTOLIC BLOOD PRESSURE: 58 MMHG | TEMPERATURE: 99.5 F | BODY MASS INDEX: 30.01 KG/M2 | SYSTOLIC BLOOD PRESSURE: 99 MMHG | HEART RATE: 93 BPM | HEIGHT: 65 IN | RESPIRATION RATE: 18 BRPM

## 2019-02-23 LAB
HCT VFR BLD AUTO: 44 % (ref 34–46.6)
HGB BLD-MCNC: 14 G/DL (ref 12–15.9)

## 2019-02-23 PROCEDURE — G0378 HOSPITAL OBSERVATION PER HR: HCPCS

## 2019-02-23 PROCEDURE — 25010000002 ENOXAPARIN PER 10 MG: Performed by: OBSTETRICS & GYNECOLOGY

## 2019-02-23 PROCEDURE — 85014 HEMATOCRIT: CPT | Performed by: OBSTETRICS & GYNECOLOGY

## 2019-02-23 PROCEDURE — 85018 HEMOGLOBIN: CPT | Performed by: OBSTETRICS & GYNECOLOGY

## 2019-02-23 PROCEDURE — 25010000003 CEFAZOLIN IN DEXTROSE 2-4 GM/100ML-% SOLUTION: Performed by: OBSTETRICS & GYNECOLOGY

## 2019-02-23 RX ADMIN — OXYCODONE HYDROCHLORIDE AND ACETAMINOPHEN 1 TABLET: 5; 325 TABLET ORAL at 08:51

## 2019-02-23 RX ADMIN — IBUPROFEN 400 MG: 400 TABLET ORAL at 08:51

## 2019-02-23 RX ADMIN — CEFAZOLIN SODIUM 2 G: 2 INJECTION, SOLUTION INTRAVENOUS at 06:47

## 2019-02-23 RX ADMIN — ENOXAPARIN SODIUM 40 MG: 40 INJECTION SUBCUTANEOUS at 08:52

## 2019-02-23 RX ADMIN — DOCUSATE SODIUM 100 MG: 100 CAPSULE, LIQUID FILLED ORAL at 04:47

## 2019-02-23 RX ADMIN — OXYCODONE HYDROCHLORIDE AND ACETAMINOPHEN 1 TABLET: 5; 325 TABLET ORAL at 01:00

## 2019-02-23 RX ADMIN — IBUPROFEN 400 MG: 400 TABLET ORAL at 13:35

## 2019-02-25 LAB
LAB AP CASE REPORT: NORMAL
PATH REPORT.FINAL DX SPEC: NORMAL
PATH REPORT.GROSS SPEC: NORMAL

## 2019-03-12 NOTE — OP NOTE
[facility] MAIN OR     PATIENT NAME:   HEAVEN LEI    :  1955     DATE OF OPERATION:  2019     PREOPERATIVE DIAGNOSES:   1.  Rectocele, N81.6.   2.  Enterocele, N81.5.   3.  Stress urinary incontinence, N39.3.  4.  Weak pubocervical tissue, N81.82.  5.  Weak rectovaginal tissue, N81.83.  6.  Incomplete defecation, R15.0.  7.  Fecal incontinence, R15.9.  8. Incomplete bladder emptying, R39.14.  9. Mesh complication, T83.711A.       POSTOPERATIVE DIAGNOSES: Same      SURGEON(S): Kaci Goodman M.D., MSc, FACOG, FACS      SCRUB NURSE: Perla Sauer RN, BSN     PROCEDURE(S) PERFORMED:   1. Pubovaginal sling, retropubic, porcine, 50746.  2. Removal of pubovaginal mesh sling, 38113.        2.   Posterior colporrhaphy, 96804.         3.  Extraperitoneal colpopexy sacrospinous ligament fixation, 97132        4.  Vaginal enterocele repair, 04407.        5.  Insertion of posterior vaginal porcine graft, 44639 add on.        6.  Insertion of anterior vaginal porcine graft, 65060 add on.        7.  Cystourethroscopy     FINDING(S): On cystourethroscopy following all the procedures, there was bilateral efflux of Pyridium stained urine from both the right and the left ureteral orifices. There were no lesions or foreign material of the bladder or the urethra.       SPECIMENS: Vaginal mesh sling material     DESCRIPTION OF PROCEDURE(S): The patient was taken to the Operating Room with a peripheral IV in place. She underwent the induction of general endotracheal anesthesia, was   prepped and draped in the dorsal lithotomy position in [facility]. Cystourethroscopy showed no lesions or foreign material of the bladder or the urethra and there was bilateral efflux of Pyridium stained urine    from both the right and the left ureteral orifices. The cystoscope was removed and a Lyles catheter was placed and set to straight drainage.   A suburethral incision was made and dissected bilaterally. The  mesh sling was identified and grasped with a right angle clamp and dissected bilaterally and the vaginal portion of the graft was removed and sent to Pathology. A sheet of porcine material was used to create a porcine sling. This retropubic 1 centimeter porcine sling was placed.  Cystourethroscopy with each transvaginal retropubic introducer in place showed no lesions or foreign material of the bladder or the urethra. The pubovaginal sling was adjusted without tension so that a curved Moreira easily fit between the suburethral tissue and the tape. The excess suprapubic tape ends were trimmed and the skin lifted away. These incisions were closed with 4- 0 Vicryl suture. With a Lyles catheter in place the suburethral incision was closed with 2-0 Vicryl and was hemostatic.  A posterior vaginal incision was made and the rectum dissected from the vagina. The sacrospinous ligaments were identified bilaterally and 0 permanent suture on a Capio Slim was placed in the right sacrospinous ligament and into the left sacrospinous ligament.  These sutures were attached to a T-shaped porcine graft and tied down to accomplish the extraperitoneal colpopexy sacrospinous ligament fixation. The inferior portion of the graft was tied down with 0 Vicryl. The enterocele was closed with 0 Vicryl suture. The posterior colporrhaphy was performed, plicating the rectovaginal fascia in the midline with 0 Vicryl interrupted sutures. The perineorrhaphy was performed with interrupted 0 Vicryl.  The vaginal epithelium was closed with 2-0 Vicryl Cystourethroscopy showed no lesions or foreign material of the bladder or the urethra and there was bilateral efflux of Pyridium stained urine from both the right and the left ureteral orifices. The cystoscope was removed and a Lyles catheter was placed and set to straight drainage. An estrogen permeated vaginal pack was placed into the vagina. The patient was taken out of the dorsal lithotomy position,  awakened from anesthesia and taken to the Recovery Room in good condition.  There were no complications to the procedures.     COUNTS: All final needle, sponge, and instrument counts were correct.      ESTIMATED BLOOD LOSS:  100 milliliters.      FLUIDS:  1200 milliliters crystalloid.      URINE OUTPUT: 100 milliliters.            KIERRA SAUCEDA M.D., FACOG, FACS

## 2019-03-12 NOTE — DISCHARGE SUMMARY
Physician Discharge Summary  Patient Identification:  Name: Marychuy De Paz  Age: 63 y.o.  Sex: female  :  1955  MRN: [id number]    Admit date: 2019    Discharge date and time: 2019  6:17 PM    Admitting Physician: Kaci Goodman MD    Discharge Physician: Kaci Goodman MD    Admission Diagnoses: Rectocele [N81.6]    Hospital Problems:   Principal Problem:    Rectocele  Active Problems:    Cystocele, midline    Vaginal enterocele, congenital or acquired    Female stress incontinence    Feeling of incomplete bladder emptying    Incomplete defecation    Fecal incontinence    Erosion of implanted vaginal mesh to surrounding organ or tissue (CMS/[facility])        Discharge Diagnoses: SAME    Discharged Condition: good    Hospital Course: surgery and post operative care    Consults:   None}    Disposition:  Home    Patient Instructions:   [unfilled]  Follow-up Information     Felipa Castillo APRN .    Specialty:  Family Medicine  Contact information:  [address] [address]  [phone]                       Signed:  Kaci Goodman MD  3/12/2019

## 2019-03-27 ENCOUNTER — OFFICE VISIT (OUTPATIENT)
Dept: FAMILY MEDICINE CLINIC | Facility: CLINIC | Age: 64
End: 2019-03-27

## 2019-03-27 VITALS
BODY MASS INDEX: 30.21 KG/M2 | SYSTOLIC BLOOD PRESSURE: 114 MMHG | DIASTOLIC BLOOD PRESSURE: 76 MMHG | RESPIRATION RATE: 23 BRPM | HEIGHT: 65 IN | OXYGEN SATURATION: 98 % | TEMPERATURE: 99.8 F | WEIGHT: 181.3 LBS | HEART RATE: 117 BPM

## 2019-03-27 DIAGNOSIS — E03.9 ACQUIRED HYPOTHYROIDISM: ICD-10-CM

## 2019-03-27 DIAGNOSIS — Z00.00 ANNUAL PHYSICAL EXAM: Primary | ICD-10-CM

## 2019-03-27 PROBLEM — G25.81 RESTLESS LEG: Status: RESOLVED | Noted: 2017-04-03 | Resolved: 2019-03-27

## 2019-03-27 PROBLEM — E61.1 IRON DEFICIENCY: Status: RESOLVED | Noted: 2017-04-03 | Resolved: 2019-03-27

## 2019-03-27 PROBLEM — F41.9 ANXIETY: Status: RESOLVED | Noted: 2017-04-03 | Resolved: 2019-03-27

## 2019-03-27 PROCEDURE — 99396 PREV VISIT EST AGE 40-64: CPT | Performed by: FAMILY MEDICINE

## 2019-03-27 RX ORDER — FLUCONAZOLE 100 MG/1
100 TABLET ORAL DAILY
COMMUNITY
End: 2020-09-08

## 2019-03-27 RX ORDER — CEPHALEXIN 500 MG/1
500 CAPSULE ORAL 2 TIMES DAILY
COMMUNITY
Start: 2019-03-27 | End: 2019-04-05

## 2019-03-27 NOTE — PROGRESS NOTES
"Chief Complaint   Patient presents with   • Establish Care     NP to [facility]   • Annual Exam   Previously seen by Felipa Castillo NP. Pt is new to me, problems are new to me.    Subjective   Marychuy De Paz is a 63 y.o. female.     History of Present Illness   She is on hormone including estrogen and progesterone. She has severe hot flashes. Had emergency hysterectomy at age 38. Just had procedure with Dr. Goodman for mesh removal and porcine tissue to support bowel and bladder.     Hypothyroidism   She takes thyroid replacement with armour she requested this related to it being a healthy support. She was on levothyroxine, she continued to feel sluggish.     Annual exam  Colonoscopy due and Dr. Casillas will perform when cleared by Dr. Goodman related to her complicated recovery from bladder and bowel sling. Pt had surgery 2/22/19, doing very well until 2 weeks ago.  She is obese. Only eats small breakfast and then dinner. Goes long periods all day without eating.   Has all gyn care with Dr. Goodman.   Pt had all of her labs 4/2018 and all were normal.    The following portions of the patient's history were reviewed and updated as appropriate: allergies, current medications, past family history, past medical history, past social history, past surgical history and problem list.    Review of Systems   Constitutional: Negative for activity change, appetite change and unexpected weight change.   Respiratory: Negative for shortness of breath.    Cardiovascular: Negative for chest pain and leg swelling.   Gastrointestinal: Negative for blood in stool, constipation and diarrhea.       Vitals:    03/27/19 1449   BP: 114/76   BP Location: Right arm   Patient Position: Sitting   Cuff Size: Adult   Pulse: 117   Resp: 23   Temp: 99.8 °F (37.7 °C)   TempSrc: Oral   SpO2: 98%   Weight: 82.2 kg (181 lb 4.8 oz)   Height: 165.1 cm (65\")       Objective   Physical Exam   Constitutional: She is oriented to person, place, and time. She appears " well-nourished. No distress.   HENT:   Right Ear: External ear normal.   Left Ear: External ear normal.   Nose: Nose normal.   Mouth/Throat: Oropharynx is clear and moist. No oropharyngeal exudate.   Bilateral ear canals and tympanic membranes appear normal.   Eyes: Conjunctivae and EOM are normal. Right eye exhibits no discharge. Left eye exhibits no discharge. No scleral icterus.   Neck: Neck supple. No thyromegaly present.   Cardiovascular: Normal rate, regular rhythm, normal heart sounds and intact distal pulses. Exam reveals no gallop and no friction rub.   No murmur heard.  Pulmonary/Chest: Effort normal and breath sounds normal. No respiratory distress. She has no wheezes. She has no rales. She exhibits no tenderness.   Abdominal: Soft. Bowel sounds are normal. She exhibits no distension and no mass. There is no tenderness. There is no guarding.   Musculoskeletal: She exhibits no edema or deformity.   Lymphadenopathy:     She has no cervical adenopathy.   Neurological: She is alert and oriented to person, place, and time. She exhibits normal muscle tone. Coordination normal.   Skin: Skin is warm and dry.   Psychiatric: She has a normal mood and affect. Her behavior is normal.   Vitals reviewed.      Assessment/Plan   Marychuy was seen today for establish care and annual exam.    Diagnoses and all orders for this visit:    Annual physical exam  We discussed the importance of regular physical activity.  Cardiovascular activity for the equivalent of 30 minutes 5 days per week.  We also discussed the importance of healthy diet to avoid weight gain and sugar intolerance.  I recommend predominantly filling the diet with vegetables and lean meats followed by fruits and whole grains.  I also recommend overall avoiding processed foods.    Acquired hypothyroidism  -     TSH  -     T4

## 2019-03-28 LAB
T4 SERPL-MCNC: 5.75 MCG/DL (ref 4.5–11.7)
TSH SERPL DL<=0.005 MIU/L-ACNC: 0.9 MIU/ML (ref 0.27–4.2)

## 2019-05-21 ENCOUNTER — TELEPHONE (OUTPATIENT)
Dept: FAMILY MEDICINE CLINIC | Facility: CLINIC | Age: 64
End: 2019-05-21

## 2019-05-21 NOTE — TELEPHONE ENCOUNTER
Pt called office today r/t her armour thyroid. Pt states that during her last visit you, you had  Discussed with her about changing  this medication to another one. Please advise.

## 2019-05-22 NOTE — TELEPHONE ENCOUNTER
First line recommended for hypothyroidism is levothyroxine. She told me she tried this and she still felt sluggish. I'm not sure if she wants to try it again.

## 2019-05-23 NOTE — TELEPHONE ENCOUNTER
Pt states that your last statement is true but during visit you spoke of doing a combo drug. Please advise. Pt only has 3 armour tablets left.

## 2019-05-24 RX ORDER — LIOTHYRONINE SODIUM 5 UG/1
5 TABLET ORAL DAILY
Qty: 30 TABLET | Refills: 1 | Status: SHIPPED | OUTPATIENT
Start: 2019-05-24 | End: 2019-07-29 | Stop reason: SDUPTHER

## 2019-05-24 RX ORDER — LEVOTHYROXINE SODIUM 0.05 MG/1
50 TABLET ORAL DAILY
Qty: 30 TABLET | Refills: 1 | Status: SHIPPED | OUTPATIENT
Start: 2019-05-24 | End: 2019-07-29 | Stop reason: SDUPTHER

## 2019-05-24 NOTE — TELEPHONE ENCOUNTER
It's not a combo it would be the levothyroxine and the liothyronine which is T3 in addition. I can send to the pharmacy.

## 2019-07-29 RX ORDER — LEVOTHYROXINE SODIUM 0.05 MG/1
50 TABLET ORAL DAILY
Qty: 90 TABLET | Refills: 0 | Status: SHIPPED | OUTPATIENT
Start: 2019-07-29 | End: 2019-10-23 | Stop reason: SDUPTHER

## 2019-07-29 RX ORDER — LIOTHYRONINE SODIUM 5 UG/1
5 TABLET ORAL DAILY
Qty: 90 TABLET | Refills: 0 | Status: SHIPPED | OUTPATIENT
Start: 2019-07-29 | End: 2019-10-23 | Stop reason: SDUPTHER

## 2019-10-24 RX ORDER — LEVOTHYROXINE SODIUM 0.05 MG/1
50 TABLET ORAL DAILY
Qty: 90 TABLET | Refills: 0 | Status: SHIPPED | OUTPATIENT
Start: 2019-10-24 | End: 2020-01-22

## 2019-10-24 RX ORDER — LIOTHYRONINE SODIUM 5 UG/1
5 TABLET ORAL DAILY
Qty: 90 TABLET | Refills: 0 | Status: SHIPPED | OUTPATIENT
Start: 2019-10-24 | End: 2020-01-22

## 2019-11-18 ENCOUNTER — TRANSCRIBE ORDERS (OUTPATIENT)
Dept: ADMINISTRATIVE | Facility: HOSPITAL | Age: 64
End: 2019-11-18

## 2019-11-18 DIAGNOSIS — J32.9 CHRONIC SINUSITIS, UNSPECIFIED LOCATION: Primary | ICD-10-CM

## 2019-11-19 ENCOUNTER — HOSPITAL ENCOUNTER (OUTPATIENT)
Dept: CT IMAGING | Facility: HOSPITAL | Age: 64
Discharge: HOME | End: 2019-11-19
Admitting: ALLERGY & IMMUNOLOGY

## 2019-11-19 DIAGNOSIS — J32.9 CHRONIC SINUSITIS, UNSPECIFIED LOCATION: ICD-10-CM

## 2019-11-19 PROCEDURE — 70486 CT MAXILLOFACIAL W/O DYE: CPT

## 2019-12-02 ENCOUNTER — TRANSCRIBE ORDERS (OUTPATIENT)
Dept: ADMINISTRATIVE | Facility: HOSPITAL | Age: 64
End: 2019-12-02

## 2019-12-02 DIAGNOSIS — Z12.31 VISIT FOR SCREENING MAMMOGRAM: Primary | ICD-10-CM

## 2019-12-05 ENCOUNTER — HOSPITAL ENCOUNTER (OUTPATIENT)
Dept: MAMMOGRAPHY | Facility: HOSPITAL | Age: 64
Discharge: HOME | End: 2019-12-05
Admitting: FAMILY MEDICINE

## 2019-12-05 DIAGNOSIS — Z12.31 VISIT FOR SCREENING MAMMOGRAM: ICD-10-CM

## 2019-12-05 PROCEDURE — 77067 SCR MAMMO BI INCL CAD: CPT

## 2020-01-22 RX ORDER — LEVOTHYROXINE SODIUM 0.05 MG/1
50 TABLET ORAL DAILY
Qty: 90 TABLET | Refills: 0 | Status: SHIPPED | OUTPATIENT
Start: 2020-01-22 | End: 2020-04-21 | Stop reason: SDUPTHER

## 2020-01-22 RX ORDER — LIOTHYRONINE SODIUM 5 UG/1
5 TABLET ORAL DAILY
Qty: 90 TABLET | Refills: 0 | Status: SHIPPED | OUTPATIENT
Start: 2020-01-22 | End: 2020-04-21 | Stop reason: SDUPTHER

## 2020-01-30 ENCOUNTER — TREATMENT (OUTPATIENT)
Dept: PHYSICAL THERAPY | Facility: CLINIC | Age: 65
End: 2020-01-30

## 2020-01-30 DIAGNOSIS — Z74.09 IMPAIRED MOBILITY AND ACTIVITIES OF DAILY LIVING: ICD-10-CM

## 2020-01-30 DIAGNOSIS — Z78.9 IMPAIRED MOBILITY AND ACTIVITIES OF DAILY LIVING: ICD-10-CM

## 2020-01-30 DIAGNOSIS — M53.3 SACROILIAC DYSFUNCTION: ICD-10-CM

## 2020-01-30 DIAGNOSIS — M54.50 CHRONIC BILATERAL LOW BACK PAIN WITHOUT SCIATICA: Primary | ICD-10-CM

## 2020-01-30 DIAGNOSIS — G89.29 CHRONIC BILATERAL LOW BACK PAIN WITHOUT SCIATICA: Primary | ICD-10-CM

## 2020-01-30 PROCEDURE — 97161 PT EVAL LOW COMPLEX 20 MIN: CPT | Performed by: PHYSICAL THERAPIST

## 2020-01-30 PROCEDURE — DRYNDL PR CUSTOM DRY NEEDLING SELF PAY: Performed by: PHYSICAL THERAPIST

## 2020-01-30 NOTE — PROGRESS NOTES
Physical Therapy Initial Evaluation and Plan of Care    Patient: Marychuy De Paz   : 1955  Diagnosis/ICD-10 Code:  Chronic bilateral low back pain without sciatica [M54.5, G89.29]  Referring practitioner: No ref. provider found Self-refer  Date of Initial Visit: 2020  Today's Date: 2020  Patient seen for 1 sessions           Subjective Questionnaire: Oswestry: 54/100      Subjective     Pt complains of low back pain, R > L that began 2 days ago at work. Pain shoots into R hip with movement. Hx of fall on tailbone several years ago. States both SIJs are unstable. 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 She still does PT exercises at home that she was given years ago. She would like to try dry needling.  Rates pain 5/10     Abnormal Lumbar MRI 2017, DDD     Objective         Active Range of Motion      Lumbar   Flexion: Active lumbar flexion: Bilateral low back. WFL and with pain  Extension: Active lumbar extension: L low back. WFL and with pain  Left lateral flexion: Active left lumbar lateral flexion: L low back. WFL and with pain  Right lateral flexion: Active right lumbar lateral flexion: R low back. WFL and with pain  Left rotation: WFL  Right rotation: WFL with pain     Tests      Negative slump.   Positive long sit    Additional Tests Details  L long  L ASIS inferior  B ASIS TTP  L ischial tuberosity superior     See Exercise, Manual, and Modality Logs for complete treatment.         Assessment/Plan  Pt presents w/ painful lumbar AROM, tenderness of bilateral SIJs and posterior hips, Positive long sit, and anterior rotation of left innominate. Will continue to benefit from skilled PT to address the above limitations.    Goals:  2 weeks:  1. Pt compliant with HEP  2. Pt reports pain </= 3/10 w/ ADLs  3. Pt presents w/o significant pelvic assymetry    4-6 weeks:  1. Pt self-reports <10% disability on Oswestry  2. Pt demonstrates lumbar AROM WNL pain-free  3. Pt has no  significant tenderness of involved mm.  4. Pt is independent w/ long term HEP               Manual Therapy:            5     mins  37092;  Therapeutic Exercise:    5     mins  88717;     Neuromuscular Suki:    0    mins  01613;    Therapeutic Activity:      0     mins  81418;     Gait Trainin     mins  21795;     Ultrasound:                     0     mins  23909;    Work Hardening           0      mins 59357  Iontophoresis               0   mins 77289  Dry needling 10 mins     Timed Treatment:   10   mins   Total Treatment:     30   mins     Carmen Baker PT  Physical Therapist                    PT SIGNATURE: Carmen Baker PT   DATE TREATMENT INITIATED: 2020    Initial Certification  Certification Period: 2020  I certify that the therapy services are furnished while this patient is under my care.  The services outlined above are required by this patient, and will be reviewed every 90 days.     PHYSICIAN:       DATE:     Please sign and return via fax to [phone].. Thank you, [facility].

## 2020-01-30 NOTE — PROGRESS NOTES
Physical Therapy Daily Progress Note      Visit # 1      Subjective   Pt complains of low back pain, R > L. Pain shoots into R hip with movement. Hx of fall on tailbone several years ago. States both SIJs are unstable. 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 Pain increased 48 hrs ago at work. She still does PT exercises at home that she was given years ago. She would like to have dry needling.    Abnormal Lumbar MRI 2017, DDD    Objective       Active Range of Motion     Lumbar   Flexion: Active lumbar flexion: B low back. WFL and with pain  Extension: Active lumbar extension: L low back. WFL and with pain  Left lateral flexion: Active left lumbar lateral flexion: L low back. WFL and with pain  Right lateral flexion: Active right lumbar lateral flexion: R low back. WFL and with pain  Left rotation: WFL  Right rotation: with pain    Tests       Thoracic   Negative slump.     Additional Tests Details  L long  L ASIS inferior  B ASIS TTP  L ischial tuberosity superior     See Exercise, Manual, and Modality Logs for complete treatment.       Assessment/Plan                 Manual Therapy:    ***     mins  77714;  Therapeutic Exercise:    ***     mins  38669;     Neuromuscular Suki:    ***    mins  12908;    Therapeutic Activity:     ***     mins  51551;     Gait Training:      ***     mins  38666;     Ultrasound:     ***     mins  94581;    Work Hardening           ***      mins 69766  Iontophoresis               ***   mins 22626    Timed Treatment:   ***   mins   Total Treatment:     ***   mins    Carmen Baker, PT  Physical Therapist

## 2020-02-06 ENCOUNTER — TREATMENT (OUTPATIENT)
Dept: PHYSICAL THERAPY | Facility: CLINIC | Age: 65
End: 2020-02-06

## 2020-02-06 DIAGNOSIS — M54.50 CHRONIC BILATERAL LOW BACK PAIN WITHOUT SCIATICA: Primary | ICD-10-CM

## 2020-02-06 DIAGNOSIS — Z74.09 IMPAIRED MOBILITY AND ACTIVITIES OF DAILY LIVING: ICD-10-CM

## 2020-02-06 DIAGNOSIS — Z78.9 IMPAIRED MOBILITY AND ACTIVITIES OF DAILY LIVING: ICD-10-CM

## 2020-02-06 DIAGNOSIS — M53.3 SACROILIAC DYSFUNCTION: ICD-10-CM

## 2020-02-06 DIAGNOSIS — G89.29 CHRONIC BILATERAL LOW BACK PAIN WITHOUT SCIATICA: Primary | ICD-10-CM

## 2020-02-06 PROCEDURE — DRYNDL PR CUSTOM DRY NEEDLING SELF PAY: Performed by: PHYSICAL THERAPIST

## 2020-02-06 NOTE — PROGRESS NOTES
"Physical Therapy Daily Progress Note      Visit # 2      Subjective   Pt reports significant improvement after visit . She bent over Wednesday, felt like something \"shifted\" and had increased pain. Exercises help decrease pain. She only has time for dry needling today.    Objective   + Long sit, L leg long  TTP L ASIS    Soft tissue was assessed at lumbar spine, SIJs, and posterior hips. PT noted point tenderness as well as palpable trigger points within the muscle tissue. On this date patient stated that they would like to undergo a dry needling procedure for the soft tissue dysfunction.   Patient was educated on the procedure for dry needling and consent waver was signed. Patient was informed of the risks, possible adverse effects, along with the benefits of TDN.     See Exercise, Manual, and Modality Logs for complete treatment.       Assessment/Plan  Presented with anterior rotation of L innominate. Corrected with sidelying MET. Pt tolerated dry needling very well w/ reports of decreased pain afterwards. Progress per POC.         Manual Therapy:    0     mins  79466;  Therapeutic Exercise:    0     mins  29864;     Neuromuscular Suki:    0    mins  89027;    Therapeutic Activity:     0     mins  28322;     Gait Trainin     mins  04771;     Ultrasound:     0     mins  08959;    Work Hardening           0      mins 74635  Iontophoresis               0   mins 97753  Dry needling 20 mins  Timed Treatment:   20   mins   Total Treatment:     30   mins    Carmen Baker, PT  Physical Therapist  "

## 2020-02-13 ENCOUNTER — PREP FOR SURGERY (OUTPATIENT)
Dept: OTHER | Facility: HOSPITAL | Age: 65
End: 2020-02-13

## 2020-02-13 DIAGNOSIS — Z12.11 SCREENING FOR COLON CANCER: Primary | ICD-10-CM

## 2020-02-14 PROBLEM — Z12.11 SCREENING FOR COLON CANCER: Status: ACTIVE | Noted: 2020-02-14

## 2020-02-19 ENCOUNTER — TREATMENT (OUTPATIENT)
Dept: PHYSICAL THERAPY | Facility: CLINIC | Age: 65
End: 2020-02-19

## 2020-02-19 DIAGNOSIS — G89.29 CHRONIC BILATERAL LOW BACK PAIN WITHOUT SCIATICA: Primary | ICD-10-CM

## 2020-02-19 DIAGNOSIS — M54.50 CHRONIC BILATERAL LOW BACK PAIN WITHOUT SCIATICA: Primary | ICD-10-CM

## 2020-02-19 DIAGNOSIS — Z74.09 IMPAIRED MOBILITY AND ACTIVITIES OF DAILY LIVING: ICD-10-CM

## 2020-02-19 DIAGNOSIS — Z78.9 IMPAIRED MOBILITY AND ACTIVITIES OF DAILY LIVING: ICD-10-CM

## 2020-02-19 DIAGNOSIS — M53.3 SACROILIAC DYSFUNCTION: ICD-10-CM

## 2020-02-19 PROCEDURE — 97140 MANUAL THERAPY 1/> REGIONS: CPT | Performed by: PHYSICAL THERAPIST

## 2020-02-19 PROCEDURE — 97110 THERAPEUTIC EXERCISES: CPT | Performed by: PHYSICAL THERAPIST

## 2020-02-19 PROCEDURE — 97014 ELECTRIC STIMULATION THERAPY: CPT | Performed by: PHYSICAL THERAPIST

## 2020-02-19 NOTE — PROGRESS NOTES
Physical Therapy Daily Progress Note      Visit # 3      Subjective   Pt reports she has good and bad days. Today is a good day. She went to [employer] show over the weekend and had back pain after about 1.5 hrs of standing. Wanted to bend over. Felt better if she walked.    Objective   See Exercise, Manual, and Modality Logs for complete treatment.       Assessment/Plan  Presented with very slight anterior rotation of L innominate that corrected with MET in SL. Conitnues to have tenderness of B ASIS, L SIJ, and L posterior hip. Good tolerance to new exercise. Reported pain relied with estim and ice. Progress per POC.           Manual Therapy:    10     mins  42354;  Therapeutic Exercise:    20     mins  01979;     Neuromuscular Suki:    0    mins  26820;    Therapeutic Activity:     0     mins  78870;     Gait Trainin     mins  10461;     Ultrasound:     0     mins  26882;    Work Hardening           0      mins 66665  Iontophoresis               0   mins 53560  Estim 15 mins    Timed Treatment:   30   mins   Total Treatment:     50   mins    Carmen Baker, PT  Physical Therapist

## 2020-04-21 ENCOUNTER — TELEMEDICINE (OUTPATIENT)
Dept: FAMILY MEDICINE CLINIC | Facility: CLINIC | Age: 65
End: 2020-04-21

## 2020-04-21 DIAGNOSIS — E03.9 ACQUIRED HYPOTHYROIDISM: Primary | ICD-10-CM

## 2020-04-21 PROBLEM — Z12.11 SCREENING FOR COLON CANCER: Status: RESOLVED | Noted: 2020-02-14 | Resolved: 2020-04-21

## 2020-04-21 PROCEDURE — 99213 OFFICE O/P EST LOW 20 MIN: CPT | Performed by: FAMILY MEDICINE

## 2020-04-21 RX ORDER — LIOTHYRONINE SODIUM 5 UG/1
5 TABLET ORAL DAILY
Qty: 90 TABLET | Refills: 0 | Status: SHIPPED | OUTPATIENT
Start: 2020-04-21 | End: 2020-07-27

## 2020-04-21 RX ORDER — LEVOTHYROXINE SODIUM 0.05 MG/1
50 TABLET ORAL DAILY
Qty: 90 TABLET | Refills: 0 | Status: SHIPPED | OUTPATIENT
Start: 2020-04-21 | End: 2020-07-27

## 2020-04-21 NOTE — PROGRESS NOTES
Subjective   Marychuy De Paz is a 64 y.o. female.     Chief Complaint   Patient presents with   • Hypothyroidism        History of Present Illness  Hypothyroidism  Says she is well. She is feeling good. 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  She is in need of her thyroid medication refilled. Last labs were 3/2019.   Still following with Dr. Goodman and getting treated with hormones. Says she still wears a pad everyday but more like a security blanket, she says things are pretty good and probably about as good as they are going to get with her urine and stool.   Her mammogram is up to date. She was due and scheduled for her colonoscopy but that has been postponed at this time.     The following portions of the patient's history were reviewed and updated as appropriate: allergies, current medications, past medical history, past social history and problem list.    Review of Systems   Constitutional: Negative for activity change, appetite change and unexpected weight change.   Respiratory: Negative for shortness of breath.    Cardiovascular: Negative for chest pain and leg swelling.   Gastrointestinal: Negative for blood in stool, constipation and diarrhea.       Objective   LMP  (LMP Unknown)   Physical Exam   Constitutional: She is oriented to person, place, and time. She appears well-nourished. No distress.   Eyes: Conjunctivae are normal. No scleral icterus.   Pulmonary/Chest: Effort normal. No respiratory distress.   Neurological: She is alert and oriented to person, place, and time.   Psychiatric: She has a normal mood and affect. Her behavior is normal.       Assessment/Plan   Marychuy was seen today for hypothyroidism.    Diagnoses and all orders for this visit:    Acquired hypothyroidism    Other orders  -     levothyroxine (SYNTHROID, LEVOTHROID) 50 MCG tablet; Take 1 tablet by mouth Daily.  -     liothyronine (CYTOMEL) 5 MCG tablet; Take 1 tablet by mouth Daily.      She is doing well. She is  working at  but no lab so we will just postpone her lab work for another few months. No concerning symptoms.     Patient gave consent today for a telehealth video visit as following recommendations of our governor and CDC during the COVID-19 pandemic.    7 min was spent on the video call with pt.

## 2020-05-12 ENCOUNTER — TELEPHONE (OUTPATIENT)
Dept: SURGERY | Facility: CLINIC | Age: 65
End: 2020-05-12

## 2020-07-27 RX ORDER — LEVOTHYROXINE SODIUM 0.05 MG/1
50 TABLET ORAL DAILY
Qty: 90 TABLET | Refills: 0 | Status: SHIPPED | OUTPATIENT
Start: 2020-07-27 | End: 2020-10-22

## 2020-07-27 RX ORDER — LIOTHYRONINE SODIUM 5 UG/1
5 TABLET ORAL DAILY
Qty: 90 TABLET | Refills: 0 | Status: SHIPPED | OUTPATIENT
Start: 2020-07-27 | End: 2020-10-22

## 2020-09-08 ENCOUNTER — OFFICE VISIT (OUTPATIENT)
Dept: OBSTETRICS AND GYNECOLOGY | Facility: CLINIC | Age: 65
End: 2020-09-08

## 2020-09-08 VITALS
BODY MASS INDEX: 30.12 KG/M2 | DIASTOLIC BLOOD PRESSURE: 86 MMHG | WEIGHT: 180.8 LBS | HEIGHT: 65 IN | SYSTOLIC BLOOD PRESSURE: 132 MMHG

## 2020-09-08 DIAGNOSIS — N95.1 VASOMOTOR SYMPTOMS DUE TO MENOPAUSE: ICD-10-CM

## 2020-09-08 DIAGNOSIS — Z01.419 PAP SMEAR, LOW-RISK: ICD-10-CM

## 2020-09-08 DIAGNOSIS — Z11.51 SCREENING FOR HPV (HUMAN PAPILLOMAVIRUS): ICD-10-CM

## 2020-09-08 DIAGNOSIS — Z13.9 SCREENING FOR UNSPECIFIED CONDITION: ICD-10-CM

## 2020-09-08 DIAGNOSIS — L90.0 LICHEN SCLEROSUS: ICD-10-CM

## 2020-09-08 DIAGNOSIS — N94.10 DYSPAREUNIA, FEMALE: ICD-10-CM

## 2020-09-08 DIAGNOSIS — R39.15 URINARY URGENCY: ICD-10-CM

## 2020-09-08 DIAGNOSIS — Z01.419 WELL WOMAN EXAM: Primary | ICD-10-CM

## 2020-09-08 LAB
BILIRUB BLD-MCNC: NEGATIVE MG/DL
CLARITY, POC: CLEAR
COLOR UR: YELLOW
GLUCOSE UR STRIP-MCNC: NEGATIVE MG/DL
KETONES UR QL: NEGATIVE
LEUKOCYTE EST, POC: NEGATIVE
NITRITE UR-MCNC: NEGATIVE MG/ML
PH UR: 5 [PH] (ref 5–8)
PROT UR STRIP-MCNC: NEGATIVE MG/DL
RBC # UR STRIP: NEGATIVE /UL
SP GR UR: 1.03 (ref 1–1.03)
UROBILINOGEN UR QL: NORMAL

## 2020-09-08 PROCEDURE — 99213 OFFICE O/P EST LOW 20 MIN: CPT | Performed by: OBSTETRICS & GYNECOLOGY

## 2020-09-08 PROCEDURE — 81002 URINALYSIS NONAUTO W/O SCOPE: CPT | Performed by: OBSTETRICS & GYNECOLOGY

## 2020-09-08 PROCEDURE — 99386 PREV VISIT NEW AGE 40-64: CPT | Performed by: OBSTETRICS & GYNECOLOGY

## 2020-09-08 RX ORDER — TRIAMCINOLONE ACETONIDE 55 UG/1
2 SPRAY, METERED NASAL NIGHTLY
COMMUNITY
Start: 2020-07-13

## 2020-09-08 RX ORDER — SYRINGE WITH NEEDLE, 1 ML 28GX1/2"
SYRINGE, EMPTY DISPOSABLE MISCELLANEOUS
COMMUNITY
Start: 2020-06-05

## 2020-09-08 RX ORDER — LEVOCETIRIZINE DIHYDROCHLORIDE 5 MG/1
TABLET, FILM COATED ORAL
COMMUNITY
Start: 2020-06-25 | End: 2022-04-11

## 2020-09-08 RX ORDER — CLOBETASOL PROPIONATE 0.5 MG/G
OINTMENT TOPICAL 2 TIMES DAILY
Qty: 60 G | Refills: 1 | Status: SHIPPED | OUTPATIENT
Start: 2020-09-08 | End: 2020-09-22

## 2020-09-08 RX ORDER — ESTRADIOL 0.1 MG/G
1 CREAM VAGINAL 2 TIMES WEEKLY
Qty: 45 G | Refills: 6 | Status: SHIPPED | OUTPATIENT
Start: 2020-09-10 | End: 2021-09-17 | Stop reason: SDUPTHER

## 2020-09-08 NOTE — PROGRESS NOTES
GYN Annual Exam     CC- Here for annual exam.     Marychuy De Paz is a 64 y.o. female who presents for annual well woman exam. Pt is a new pt to me. Pt had a FRANCIE/BSO in  due to large mass of uterus or ovary. The mass caused damage to her bladder and she has had 3 bladder surgeries.  She had mesh erosion and has had extensive surgery to remove it. She is having a lot of urinary urgency and frequency. She has never been on bladder meds. Pt also complaining of vasomotor symptoms. She has been on HRT since her hysterectomy at age 36. She has been on estrogen, testosterone, progesterone. She has stopped these meds approximately 3 months ago because she could not find anybody refill them for her.  She reports that she had really bad hot flashes that has since improved but she still reports that she gets a hot flash at 4:30 in the morning every night and she is having difficulty sleeping.  Patient also complaining of dyspareunia and states that her and her  are sexually active and use a lubricant but intercourse is still uncomfortable.  Patient is concerned about not being on HRT as her mother has a history of osteoporosis and the patient is concerned about bone loss.    OB History        2    Para   2    Term   2            AB        Living           SAB        TAB        Ectopic        Molar        Multiple        Live Births                    Current contraception: status post hysterectomy  History of abnormal Pap smear: no  History of abnormal mammogram: yes - remote- cyst aspirated.  Family history of uterine, colon or ovarian cancer: no  Family history of breast cancer: yes - mother diagnosed at age 84    Health Maintenance   Topic Date Due   • Annual Gynecologic Pelvic and Breast Exam  1955   • TDAP/TD VACCINES (1 - Tdap) 1968   • ZOSTER VACCINE (1 of 2) 2007   • HEPATITIS C SCREENING  2016   • PAP SMEAR  2016   • COLONOSCOPY  2018   • ANNUAL PHYSICAL   "03/28/2020   • INFLUENZA VACCINE  08/01/2020   • MAMMOGRAM  12/05/2021       Past Medical History:   Diagnosis Date   • Anemia    • Breast cyst    • Disease of thyroid gland    • Diverticulosis of colon 09/05/2008   • Enterocele    • Fall 1970    fractured Sacrum    • Fall 2009   • Female cystocele    • History of anemia    • History of blood clots     LT LEG X2 AFTER INJECTION FOR SPIDER VEINS   • History of transfusion 1977    AFTER PPH   • Hypothyroidism    • Incarcerated hiatal hernia 9/20/2016   • Lichen planopilaris of vulva    • Rectocele    • Stress incontinence, female        Past Surgical History:   Procedure Laterality Date   • ANTERIOR AND POSTERIOR VAGINAL REPAIR N/A 2/22/2019    Procedure: POSTERIOR COLPORRHAPHY VAGINAL ENTEROCELE REPAIR;  Surgeon: Kaci Goodman MD;  Location: [facility];  Service: Gynecology   • BLADDER REPAIR      x2   • BREAST CYST ASPIRATION     • BUNIONECTOMY Bilateral 1999   • HYSTERECTOMY      FULL   • OOPHORECTOMY     • IA LAP,ESOPHAGOGAST FUNDOPLASTY N/A 9/21/2016    Procedure: HIATAL HERNIA REPAIR LAPAROSCOPIC WITH MESH;  Surgeon: Donaldo Nuñez MD;  Location: [facility] OR;  Service: General   • PUBOVAGINAL SLING N/A 2/22/2019    Procedure: PUBO VAGINAL SLING REMOVAL AND REVISION OF SLING WITH CYSTOURETHROSCOPY;  Surgeon: Kaci Goodman MD;  Location: [facility] OR;  Service: Gynecology   • SHOULDER ROTATOR CUFF REPAIR Left 04/2014   • TONSILLECTOMY     • TONSILLECTOMY  09/01/1969   • UTEROSACRAL LIGAMENT TRANSECTION N/A 2/22/2019    Procedure: SACROSPINOUS LIGAMENT FIXATION EXTRAPERITONEAL COLPOPEXY, INSERTION OF VAGINAL GRAFT ANTERIOR AND POSTERIOR, PUBOVAGINAL SLING;  Surgeon: Kaci Goodman MD;  Location: [facility];  Service: Gynecology         Current Outpatient Medications:   •  B-D ALLERGY SYRINGE 1CC/28G 28G X 1/2\" 1 ML misc, USE UTD, Disp: , Rfl:   •  Chlorphen-PSE-Atrop-Hyos-Scop (STAHIST PO), Take 1 tablet by mouth As Needed (ALLERGIES)., " Disp: , Rfl:   •  clobetasol (TEMOVATE) 0.05 % ointment, Apply  topically to the appropriate area as directed 2 (Two) Times a Day for 14 days., Disp: 60 g, Rfl: 1  •  coenzyme Q10 100 MG capsule, Take 100 mg by mouth Daily. DUE TO STOP 7 DAYS PRIOR TO SURGERY, Disp: , Rfl:   •  EPINEPHrine (EPIPEN) 0.3 MG/0.3ML solution auto-injector injection, INJECT INTRAMUSCULARLY AS DIRECTED, Disp: , Rfl: 0  •  [START ON 9/10/2020] estradiol (ESTRACE) 0.1 MG/GM vaginal cream, Insert 1 g into the vagina 2 (Two) Times a Week., Disp: 45 g, Rfl: 6  •  Lactobacillus (ULTIMATE PROBIOTIC FORMULA PO), Take 1 tablet by mouth Daily., Disp: , Rfl:   •  levocetirizine (XYZAL) 5 MG tablet, , Disp: , Rfl:   •  levothyroxine (SYNTHROID, LEVOTHROID) 50 MCG tablet, TAKE 1 TABLET BY MOUTH DAILY, Disp: 90 tablet, Rfl: 0  •  liothyronine (CYTOMEL) 5 MCG tablet, TAKE 1 TABLET BY MOUTH DAILY, Disp: 90 tablet, Rfl: 0  •  magnesium gluconate (MAGONATE) 500 MG tablet, Take 500 mg by mouth Daily. DUE TO STOP 7 DAYS PRIOR TO SURGERY, Disp: , Rfl:   •  Multiple Vitamins-Minerals (ENERGY BOOSTER PO), Take 1 tablet by mouth Daily. DUE TO STOP 7 DAYS PRIOR TO SURGERY, Disp: , Rfl:   •  NASAL ALLERGY 24 HOUR 55 MCG/ACT nasal inhaler, U 2 SPRAYS IEN QD, Disp: , Rfl:   •  NON FORMULARY, 1.25 mg 2 (Two) Times a Week. ESTRIDIOL CREAM - 1 KNUCKLE APPLIED TWICE WEEKLY, Disp: , Rfl:   •  NON FORMULARY, Inject 1 dose under the skin into the appropriate area as directed 2 (Two) Times a Week. Allergy shot, Disp: , Rfl:   •  progesterone (PROMETRIUM) 100 MG capsule, Take 1 capsule by mouth Daily., Disp: 90 capsule, Rfl: 3  •  Triamcinolone Acetonide (NASACORT) 55 MCG/ACT nasal inhaler, U 2 SPRAYS IEN QD, Disp: , Rfl:   •  valACYclovir (VALTREX) 500 MG tablet, Take 1,000 mg by mouth As Needed (FEVER BLISTERS)., Disp: , Rfl:     Allergies   Allergen Reactions   • Pollen Extract Irritability       Social History     Tobacco Use   • Smoking status: Never Smoker   • Smokeless  "tobacco: Never Used   Substance Use Topics   • Alcohol use: No   • Drug use: No       Family History   Problem Relation Age of Onset   • Breast cancer Mother    • Hypertension Mother    • Hypothyroidism Mother    • Lung cancer Father    • Hypertension Sister    • Kidney cancer Sister    • Tongue cancer Sister    • Diabetes Brother    • Prostate cancer Brother    • Breast cancer Maternal Aunt    • Breast cancer Maternal Aunt    • Breast cancer Maternal Aunt    • Malig Hyperthermia Neg Hx        Review of Systems   Constitutional: Positive for fatigue. Negative for appetite change, chills, fever and unexpected weight change.   Gastrointestinal: Negative for abdominal distention, abdominal pain, anal bleeding, blood in stool, constipation, diarrhea, nausea and vomiting.   Endocrine: Positive for heat intolerance.   Genitourinary: Positive for dyspareunia. Negative for dysuria, menstrual problem, pelvic pain, vaginal bleeding, vaginal discharge and vaginal pain.       /86   Ht 165.1 cm (65\")   Wt 82 kg (180 lb 12.8 oz)   LMP  (LMP Unknown)   BMI 30.09 kg/m²     Physical Exam   Constitutional: She is oriented to person, place, and time. She appears well-developed and well-nourished.   HENT:   Mouth/Throat: Normal dentition. No dental caries.   Cardiovascular: Normal rate, regular rhythm and normal heart sounds.   Pulmonary/Chest: Effort normal and breath sounds normal. No stridor. No respiratory distress. She has no wheezes. Right breast exhibits no inverted nipple, no mass, no nipple discharge, no skin change and no tenderness. Left breast exhibits no inverted nipple, no mass, no nipple discharge, no skin change and no tenderness.   Abdominal: Soft. She exhibits no distension and no mass. There is no tenderness.   Genitourinary: There is no rash, tenderness or lesion on the right labia. There is no rash, tenderness or lesion on the left labia. Uterus is not tender. Right adnexum displays no mass, no tenderness " and no fullness. Left adnexum displays no mass, no tenderness and no fullness. No tenderness or bleeding in the vagina. No vaginal discharge found.   Genitourinary Comments: evidence of lichens sclerosis of vagina noted.   Musculoskeletal: Normal range of motion. She exhibits no edema or tenderness.   Neurological: She is alert and oriented to person, place, and time. No cranial nerve deficit. Coordination normal.   Skin: Skin is warm. No rash noted. No erythema.   Psychiatric: She has a normal mood and affect. Her behavior is normal. Judgment and thought content normal.   Vitals reviewed.         Assessment/Plan    1) GYN HM: Check pap smear of vaginal cuff.   SBE demonstrated and encouraged.  2) : Discussed with patient that she should no longer be on HRT due to the increased risk of stroke and heart attack over the age of 59.  Patient is reporting some difficulty sleeping at night and a hot flash that happens at 4:30 in the morning.  Discussed trying Prometrium only at night to see if this would help with sleeping as well as the hot flash that she is having.  Patient is interested in proceeding with this management.  3) Bone health - Weight bearing exercise, dietary calcium recommendations and vitamin D reviewed. Family hx of osteoporosis: mother. Check Bone density. Normal 2015.  4) Diet and Exercise discussed  5) Smoking Status: nonsmoker  6) Social: Patient works in radiology at [employer]  7) urinary urgency and frequency: Patient has had extensive bladder surgery including removal of mesh and repair of her bladder.  Since that time she is experienced increasing urgency and frequency and reports that she is up several times at night to urinate.  Gave samples of Myrbetriq 25 mg daily to see if this will help with her symptoms.  8) Dyspareiunia: Rx Estrace cream  9) Lichen sclerosis: Rx Clobetasol daily x 2 weeks prn  10) Follow up prn and 1 year       Diagnoses and all orders for this visit:    Well woman  "exam  -     Pap IG, HPV-hr    Screening for unspecified condition  -     POC Urinalysis Dipstick  -     DEXA Bone Density Axial; Future    Pap smear, low-risk  -     Pap IG, HPV-hr    Screening for HPV (human papillomavirus)  -     Pap IG, HPV-hr    Urinary urgency    Vasomotor symptoms due to menopause    Lichen sclerosus    Dyspareunia, female    Other orders  -     B-D ALLERGY SYRINGE 1CC/28G 28G X 1/2\" 1 ML misc; USE UTD  -     Triamcinolone Acetonide (NASACORT) 55 MCG/ACT nasal inhaler; U 2 SPRAYS IEN QD  -     levocetirizine (XYZAL) 5 MG tablet  -     NASAL ALLERGY 24 HOUR 55 MCG/ACT nasal inhaler; U 2 SPRAYS IEN QD  -     clobetasol (TEMOVATE) 0.05 % ointment; Apply  topically to the appropriate area as directed 2 (Two) Times a Day for 14 days.  -     estradiol (ESTRACE) 0.1 MG/GM vaginal cream; Insert 1 g into the vagina 2 (Two) Times a Week.        Amy Leonardo,   9/8/2020  09:53  "

## 2020-09-11 ENCOUNTER — TRANSCRIBE ORDERS (OUTPATIENT)
Dept: ADMINISTRATIVE | Facility: HOSPITAL | Age: 65
End: 2020-09-11

## 2020-09-11 DIAGNOSIS — Z12.31 SCREENING MAMMOGRAM, ENCOUNTER FOR: Primary | ICD-10-CM

## 2020-09-11 LAB
CYTOLOGIST CVX/VAG CYTO: NORMAL
CYTOLOGY CVX/VAG DOC CYTO: NORMAL
CYTOLOGY CVX/VAG DOC THIN PREP: NORMAL
DX ICD CODE: NORMAL
HIV 1 & 2 AB SER-IMP: NORMAL
HPV I/H RISK 1 DNA CVX QL PROBE+SIG AMP: NORMAL
HPV I/H RISK 4 DNA CVX QL PROBE+SIG AMP: NEGATIVE
OTHER STN SPEC: NORMAL
STAT OF ADQ CVX/VAG CYTO-IMP: NORMAL

## 2020-09-15 PROBLEM — Z12.11 SCREENING FOR COLON CANCER: Status: ACTIVE | Noted: 2020-02-14

## 2020-09-17 ENCOUNTER — HOSPITAL ENCOUNTER (OUTPATIENT)
Dept: BONE DENSITY | Facility: HOSPITAL | Age: 65
Discharge: HOME | End: 2020-09-17
Admitting: OBSTETRICS & GYNECOLOGY

## 2020-09-17 DIAGNOSIS — Z13.9 SCREENING FOR UNSPECIFIED CONDITION: ICD-10-CM

## 2020-09-17 PROCEDURE — 77080 DXA BONE DENSITY AXIAL: CPT

## 2020-09-23 ENCOUNTER — TELEPHONE (OUTPATIENT)
Dept: OBSTETRICS AND GYNECOLOGY | Facility: CLINIC | Age: 65
End: 2020-09-23

## 2020-09-23 NOTE — TELEPHONE ENCOUNTER
Yes. From my note we had talked about her continuing the prometrium at night to help with sleep and hot flashes? I can write refills if needed

## 2020-09-26 ENCOUNTER — FLU SHOT (OUTPATIENT)
Dept: FAMILY MEDICINE CLINIC | Facility: CLINIC | Age: 65
End: 2020-09-26

## 2020-09-26 DIAGNOSIS — Z23 NEED FOR INFLUENZA VACCINATION: ICD-10-CM

## 2020-09-26 PROCEDURE — 90471 IMMUNIZATION ADMIN: CPT | Performed by: FAMILY MEDICINE

## 2020-09-26 PROCEDURE — 90686 IIV4 VACC NO PRSV 0.5 ML IM: CPT | Performed by: FAMILY MEDICINE

## 2020-10-08 ENCOUNTER — TELEPHONE (OUTPATIENT)
Dept: OBSTETRICS AND GYNECOLOGY | Facility: CLINIC | Age: 65
End: 2020-10-08

## 2020-10-22 RX ORDER — LIOTHYRONINE SODIUM 5 UG/1
5 TABLET ORAL DAILY
Qty: 90 TABLET | Refills: 0 | Status: SHIPPED | OUTPATIENT
Start: 2020-10-22 | End: 2021-02-02 | Stop reason: SDUPTHER

## 2020-10-22 RX ORDER — LEVOTHYROXINE SODIUM 0.05 MG/1
50 TABLET ORAL DAILY
Qty: 90 TABLET | Refills: 0 | Status: SHIPPED | OUTPATIENT
Start: 2020-10-22 | End: 2021-02-02 | Stop reason: SDUPTHER

## 2020-11-11 ENCOUNTER — TELEPHONE (OUTPATIENT)
Dept: FAMILY MEDICINE CLINIC | Facility: CLINIC | Age: 65
End: 2020-11-11

## 2020-11-11 NOTE — TELEPHONE ENCOUNTER
Spoke to patient and she said her COVID test came back negative and that she is feeling better but not completely.  She said that she will need a letter to release her for work and that she felt she needed to discuss this with you.  She is scheduled to go back to work on Tuesday. I put her in a same day spot on Friday for a telephone call. If that is not OK please let me know and I can get her rescheduled. She also is having trouble with her internet at home and said once a letter is written she will let us know a fax number.

## 2020-11-11 NOTE — TELEPHONE ENCOUNTER
PATIENT WAS SICK AND SHE WAS TESTED FOR COVID-19 AT ; IT CAME BACK NEGATIVE. SHE WANTED SOME DIRECTION TO WHEN SHE SHOULD GO BACK TO WORK.   PLEASE GIVE HER A CALL WITH INSTRUCTIONS.    NNEKA #: [phone]

## 2020-11-12 ENCOUNTER — APPOINTMENT (OUTPATIENT)
Dept: BONE DENSITY | Facility: HOSPITAL | Age: 65
End: 2020-11-12

## 2020-11-13 ENCOUNTER — OFFICE VISIT (OUTPATIENT)
Dept: FAMILY MEDICINE CLINIC | Facility: CLINIC | Age: 65
End: 2020-11-13

## 2020-11-13 DIAGNOSIS — B34.9 VIRAL ILLNESS: Primary | ICD-10-CM

## 2020-11-13 PROCEDURE — 99442 PR PHYS/QHP TELEPHONE EVALUATION 11-20 MIN: CPT | Performed by: FAMILY MEDICINE

## 2020-11-13 NOTE — PROGRESS NOTES
Subjective   Marychuy De Paz is a 64 y.o. female.     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   She is improving but still has fatigue and gets SOB with activity in the house. Yesterday she went downstairs to get something and when she came back up she had to sit and rest for about 10 min. She says she is not sure about fever because she has been taking tylenol cold and flu scheduled around the clock. She missed a dose a day this week and her  thought she looked like she looked when she first got sick and she said she felt terrible so after that decided not to go without regularly taking the medication.       The following portions of the patient's history were reviewed and updated as appropriate: allergies, current medications, past family history, past medical history, past social history, past surgical history and problem list.    Review of Systems  Fatigue  Sob with exertion  Low energy  Appetite improving  She has sense of taste return   Loss of sense of smell persists    Objective   LMP  (LMP Unknown)   Physical Exam  Not available for visit type  She does have strong voice, does not sound SOB with talking, speaking in full sentences.    Assessment/Plan   Diagnoses and all orders for this visit:    1. Viral illness (Primary)      She tested negative for COVID-19 but her symptoms are consistent with infection so we are treating it as such. She has been home and under quarantine. States she has some lingering symptoms but she has definite improvement in the way she is feeling from 3-5 days ago.   We need to make sure she is fever free for 24 hours without tylenol. I told her she needed to do this over the weekend before returning to work. She did not have to start today but needed to know before returning to work. If fever  free she can return to work on 11/17/20 but if not she should notify me and we will have to change her date. I will put a not in the chart and impressed upon her as a health care working this is so critical for her to ensure that she is fever free prior to returning to work.   She agreed.     Patient gave consent today for a telehealth telephone visit as following recommendations of our governor and CDC during the COVID-19 pandemic.    12 min was spent in discussion with pt and greater than 50% of that time was spent counseling.

## 2020-11-14 ENCOUNTER — TELEPHONE (OUTPATIENT)
Dept: FAMILY MEDICINE CLINIC | Facility: TELEHEALTH | Age: 65
End: 2020-11-14

## 2020-11-14 DIAGNOSIS — Z20.822 LAB TEST NEGATIVE FOR COVID-19 VIRUS: Primary | ICD-10-CM

## 2020-11-14 NOTE — TELEPHONE ENCOUNTER
Marychuy De Paz  1955     Telephone Visit    Chief Complaint   Patient presents with   • Letter for School/Work     Covid negative, needs RTW note       HPI  Marychuy De Paz is a 64 y.o. female who was called after notification that she needed a RTW for clearance to return to work. The patient had a Covid test on 11/09/2020 and the results were negative. The patient was having symptoms suspicious for Covid. She had head congestion, headache, body aches, no taste or smell with shortness of breath and weakness. She says she is fatigued and decreased appetite. She has been taking Tylenol and it has kept temperature down. She has no known exposure. She has always worn PPE with any patients.      The following portions of the patient's history were reviewed and updated as appropriate: allergies, current medications, past social history and problem list.    Past Medical History:   Diagnosis Date   • Anemia    • Breast cyst    • Disease of thyroid gland    • Diverticulosis of colon 09/05/2008   • Enterocele    • Fall 1970    fractured Sacrum    • Fall 2009   • Female cystocele    • History of anemia    • History of blood clots     LT LEG X2 AFTER INJECTION FOR SPIDER VEINS   • History of transfusion 1977    AFTER PPH   • Hypothyroidism    • Incarcerated hiatal hernia 9/20/2016   • Lichen planopilaris of vulva    • Rectocele    • Stress incontinence, female      Social History     Socioeconomic History   • Marital status:      Spouse name: Not on file   • Number of children: 2   • Years of education: Associates Degree    • Highest education level: Not on file   Occupational History   • Occupation: Radiology Technologist    Tobacco Use   • Smoking status: Never Smoker   • Smokeless tobacco: Never Used   Substance and Sexual Activity   • Alcohol use: No   • Drug use: No   • Sexual activity: Defer   Social History Narrative    LIVES WITH        REVIEW OF SYSTEMS  History obtained from chart review and the  patient  General ROS: negative for - chills, positive for - fever, fatigue  ENT ROS: positive for - headaches and nasal congestion  negative for - sore throat  Respiratory ROS: positive for - shortness of breath  negative for - cough or wheezing  Gastrointestinal ROS: negative  Musculoskeletal ROS: positive for - muscle pain    No Physical Exam    Diagnoses and all orders for this visit:    1. Lab test negative for COVID-19 virus (Primary)    RTW after 10 day minimal home isolation            This visit was performed via Telehealth. This patient has been released to return to work as instructed on the RTW form and instructed to forward RTW note to Employee Health as instructed. Follow up with new onset of symptoms or worsening of symptoms.        Nani Mann, APRN  11/14/20  18:33 EST

## 2020-11-16 ENCOUNTER — TELEPHONE (OUTPATIENT)
Dept: FAMILY MEDICINE CLINIC | Facility: CLINIC | Age: 65
End: 2020-11-16

## 2020-11-16 NOTE — TELEPHONE ENCOUNTER
Book with patient.  She has been off of the Tylenol for greater than 24 hours and is fever free.  She actually went to her allergist today who thinks a lot of her symptoms that have been lingering is related to her allergies and susceptibility to shortness of breath with exertion/reactive airway disease/exercise-induced asthma.  She was blowing her nose and having some blood so he changed her nasal sprays.  They retested her for her allergies and have made a plan to reexplore medications based on how she responds to the changes today and her allergy test results.  She is going to also be using mupirocin in her naris.    She says this time is been very stressful.  Her older brother is in the hospital with Covid pneumonia and he appears to be doing very poorly and prognosis is very poor.  [place] and just had a major surgery and she feels very bad that she has not been able to be with her daughter.

## 2020-11-16 NOTE — TELEPHONE ENCOUNTER
I put the letter in her chart and [facility] downstairs (attn Siena Paulino). Thanks.

## 2020-12-10 ENCOUNTER — HOSPITAL ENCOUNTER (OUTPATIENT)
Dept: MAMMOGRAPHY | Facility: HOSPITAL | Age: 65
Discharge: HOME | End: 2020-12-10
Admitting: FAMILY MEDICINE

## 2020-12-10 DIAGNOSIS — Z12.31 SCREENING MAMMOGRAM, ENCOUNTER FOR: ICD-10-CM

## 2020-12-10 PROCEDURE — 77067 SCR MAMMO BI INCL CAD: CPT

## 2020-12-10 PROCEDURE — 77063 BREAST TOMOSYNTHESIS BI: CPT

## 2021-01-08 ENCOUNTER — IMMUNIZATION (OUTPATIENT)
Dept: VACCINE CLINIC | Facility: HOSPITAL | Age: 66
End: 2021-01-08

## 2021-01-08 PROCEDURE — 91300 HC SARSCOV02 VAC 30MCG/0.3ML IM: CPT | Performed by: INTERNAL MEDICINE

## 2021-01-08 PROCEDURE — 0001A: CPT | Performed by: INTERNAL MEDICINE

## 2021-01-29 ENCOUNTER — IMMUNIZATION (OUTPATIENT)
Dept: VACCINE CLINIC | Facility: HOSPITAL | Age: 66
End: 2021-01-29

## 2021-01-29 PROCEDURE — 0002A: CPT | Performed by: INTERNAL MEDICINE

## 2021-01-29 PROCEDURE — 91300 HC SARSCOV02 VAC 30MCG/0.3ML IM: CPT | Performed by: INTERNAL MEDICINE

## 2021-02-02 RX ORDER — LIOTHYRONINE SODIUM 5 UG/1
5 TABLET ORAL DAILY
Qty: 90 TABLET | Refills: 1 | Status: SHIPPED | OUTPATIENT
Start: 2021-02-02 | End: 2021-05-04 | Stop reason: SDUPTHER

## 2021-02-02 RX ORDER — LEVOTHYROXINE SODIUM 0.05 MG/1
50 TABLET ORAL DAILY
Qty: 90 TABLET | Refills: 1 | Status: SHIPPED | OUTPATIENT
Start: 2021-02-02 | End: 2021-05-04 | Stop reason: SDUPTHER

## 2021-02-04 ENCOUNTER — TELEPHONE (OUTPATIENT)
Dept: FAMILY MEDICINE CLINIC | Facility: CLINIC | Age: 66
End: 2021-02-04

## 2021-02-04 NOTE — TELEPHONE ENCOUNTER
PATIENT STATES HER  TESTED POSITIVE FOR COVID 1 WEEK AGO. PATIENT IS CURRENTLY HAVING HEADACHE, NO TASTE, RUNNY NOSE, BODY ACHE. PATIENT WANTS TO KNOW IF SHE SHOULD GET TESTED AGAIN FOR COVID. PATIENT DOES NOT WANT TO INFECT HER CO-WORKERS. PATIENT HAS HAD BOTH VACCINATIONS. 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 PATIENT STATES SHE CAN GO TO URGENT CARE AND GET THE TEST IF NEEDED.    PLEASE ADVISE: [phone]

## 2021-02-09 RX ORDER — AMLODIPINE BESYLATE 5 MG/1
5 TABLET ORAL DAILY
Qty: 90 TABLET | Refills: 0 | Status: SHIPPED | OUTPATIENT
Start: 2021-02-09 | End: 2021-04-09 | Stop reason: SDUPTHER

## 2021-02-09 RX ORDER — AMLODIPINE BESYLATE 5 MG/1
5 TABLET ORAL DAILY
Qty: 90 TABLET | Refills: 0 | Status: SHIPPED | OUTPATIENT
Start: 2021-02-09 | End: 2021-02-09 | Stop reason: SDUPTHER

## 2021-04-09 ENCOUNTER — OFFICE VISIT (OUTPATIENT)
Dept: FAMILY MEDICINE CLINIC | Facility: CLINIC | Age: 66
End: 2021-04-09

## 2021-04-09 VITALS
HEIGHT: 65 IN | OXYGEN SATURATION: 98 % | TEMPERATURE: 96.6 F | RESPIRATION RATE: 16 BRPM | BODY MASS INDEX: 30.51 KG/M2 | WEIGHT: 183.1 LBS | DIASTOLIC BLOOD PRESSURE: 68 MMHG | HEART RATE: 94 BPM | SYSTOLIC BLOOD PRESSURE: 118 MMHG

## 2021-04-09 DIAGNOSIS — E78.5 HYPERLIPIDEMIA, UNSPECIFIED HYPERLIPIDEMIA TYPE: ICD-10-CM

## 2021-04-09 DIAGNOSIS — E53.8 B12 DEFICIENCY: ICD-10-CM

## 2021-04-09 DIAGNOSIS — Z00.00 ANNUAL PHYSICAL EXAM: Primary | ICD-10-CM

## 2021-04-09 DIAGNOSIS — Z86.39 HISTORY OF IRON DEFICIENCY: ICD-10-CM

## 2021-04-09 DIAGNOSIS — I10 ESSENTIAL HYPERTENSION: ICD-10-CM

## 2021-04-09 DIAGNOSIS — Z11.59 ENCOUNTER FOR HEPATITIS C SCREENING TEST FOR LOW RISK PATIENT: ICD-10-CM

## 2021-04-09 DIAGNOSIS — E03.9 ACQUIRED HYPOTHYROIDISM: ICD-10-CM

## 2021-04-09 PROCEDURE — 99396 PREV VISIT EST AGE 40-64: CPT | Performed by: FAMILY MEDICINE

## 2021-04-09 RX ORDER — AMLODIPINE BESYLATE 5 MG/1
5 TABLET ORAL DAILY
Qty: 90 TABLET | Refills: 1 | Status: SHIPPED | OUTPATIENT
Start: 2021-04-09 | End: 2021-08-17 | Stop reason: SDUPTHER

## 2021-04-09 RX ORDER — VALACYCLOVIR HYDROCHLORIDE 500 MG/1
TABLET, FILM COATED ORAL
Qty: 8 TABLET | Refills: 1 | Status: SHIPPED | OUTPATIENT
Start: 2021-04-09 | End: 2021-05-19

## 2021-04-09 NOTE — PROGRESS NOTES
"Chief Complaint  Annual Exam    Subjective          Marychuy De Paz presents to [facility]  History of Present Illness  Annual exam  Diet and exercise  She does albuterol before exercise and when she feels she needs it. She is doing well with this. From allergy.   Walking a lot for exercise. She works in the yard now for the season.   Gyn in sept 2020. She had DXA, mammogram.   She was lined up to do colonoscopy and was going to have Cacchione.   Not ready to get back in yet. Canceled because of pandemic. Wants to wait a little longer.   Had hiatal hernia repair, bladder surgery and she wants to get more distance from these.   Hx of     She had COVID x2 and had the vaccine  She has taste back but different when she eats protein and says just it off. Twang to it.     Objective   Vital Signs:   /68 (BP Location: Right arm, Patient Position: Sitting, Cuff Size: Adult)   Pulse 94   Temp 96.6 °F (35.9 °C) (Infrared)   Resp 16   Ht 165.1 cm (65\")   Wt 83.1 kg (183 lb 1.6 oz)   SpO2 98%   BMI 30.47 kg/m²     Physical Exam  Vitals reviewed.   Constitutional:       General: She is not in acute distress.  HENT:      Right Ear: Tympanic membrane, ear canal and external ear normal.      Left Ear: Tympanic membrane, ear canal and external ear normal.      Nose: Nose normal.      Mouth/Throat:      Mouth: Mucous membranes are moist.      Pharynx: Oropharynx is clear. No oropharyngeal exudate or posterior oropharyngeal erythema.   Eyes:      General: No scleral icterus.        Right eye: No discharge.         Left eye: No discharge.      Conjunctiva/sclera: Conjunctivae normal.   Neck:      Thyroid: No thyromegaly.      Vascular: No carotid bruit.   Cardiovascular:      Rate and Rhythm: Normal rate and regular rhythm.      Heart sounds: Normal heart sounds. No murmur heard.   No friction rub. No gallop.    Pulmonary:      Effort: Pulmonary effort is normal. No respiratory distress.      " Breath sounds: Normal breath sounds. No wheezing or rales.   Chest:      Chest wall: No tenderness.   Abdominal:      General: Bowel sounds are normal. There is no distension.      Palpations: Abdomen is soft. There is no mass.      Tenderness: There is no abdominal tenderness. There is no guarding.   Musculoskeletal:         General: No deformity.      Cervical back: Neck supple.      Right lower leg: No edema.      Left lower leg: No edema.   Lymphadenopathy:      Cervical: No cervical adenopathy.   Skin:     General: Skin is warm and dry.   Neurological:      Mental Status: She is alert and oriented to person, place, and time.      Motor: No abnormal muscle tone.      Coordination: Coordination normal.   Psychiatric:         Mood and Affect: Mood normal.         Behavior: Behavior normal.        Result Review :                 Assessment and Plan    Diagnoses and all orders for this visit:    1. Annual physical exam (Primary)    2. B12 deficiency  -     Vitamin B12    3. Acquired hypothyroidism  -     TSH  -     T4    4. Encounter for hepatitis C screening test for low risk patient  -     Hepatitis C Antibody    5. Essential hypertension  -     CBC & Differential  -     Comprehensive Metabolic Panel    6. Hyperlipidemia, unspecified hyperlipidemia type  -     Lipid Panel    7. History of iron deficiency  -     Iron and TIBC  -     Ferritin    Other orders  -     amLODIPine (NORVASC) 5 MG tablet; Take 1 tablet by mouth Daily.  Dispense: 90 tablet; Refill: 1  -     valACYclovir (VALTREX) 500 MG tablet; At onset of fever blister take 1000 mg twice daily for one day  Dispense: 8 tablet; Refill: 1  -     Iron Profile  -     Ferritin        Follow Up   No follow-ups on file.  Patient was given instructions and counseling regarding her condition or for health maintenance advice. Please see specific information pulled into the AVS if appropriate.     Preventive counseling  We discussed the importance of regular physical  activity.  Cardiovascular activity for the equivalent of 30 minutes 5 days per week.  We also discussed the importance of healthy diet to avoid weight gain and sugar intolerance.  I recommend predominantly filling the diet with vegetables and lean meats followed by fruits and whole grains.  I also recommend overall avoiding processed foods.  After much discussion over the BP medication and her current control we decided to continue on this medication.   Follows with gyn  Vaccinated to COVID   Holding on shingrix and tdap. She is going to check on there tetnas shot.   Was due for colonoscopy but cancelled with the pandemic closings. She is going to schedule and is holding a little longer at this time since newly vaccinated.

## 2021-04-10 LAB
ALBUMIN SERPL-MCNC: 4.6 G/DL (ref 3.5–5.2)
ALBUMIN/GLOB SERPL: 2 G/DL
ALP SERPL-CCNC: 73 U/L (ref 39–117)
ALT SERPL-CCNC: 31 U/L (ref 1–33)
AST SERPL-CCNC: 27 U/L (ref 1–32)
BASOPHILS # BLD AUTO: 0.04 10*3/MM3 (ref 0–0.2)
BASOPHILS NFR BLD AUTO: 0.6 % (ref 0–1.5)
BILIRUB SERPL-MCNC: 0.5 MG/DL (ref 0–1.2)
BUN SERPL-MCNC: 12 MG/DL (ref 8–23)
BUN/CREAT SERPL: 14.1 (ref 7–25)
CALCIUM SERPL-MCNC: 9.8 MG/DL (ref 8.6–10.5)
CHLORIDE SERPL-SCNC: 105 MMOL/L (ref 98–107)
CHOLEST SERPL-MCNC: 182 MG/DL (ref 0–200)
CO2 SERPL-SCNC: 25.2 MMOL/L (ref 22–29)
CREAT SERPL-MCNC: 0.85 MG/DL (ref 0.57–1)
EOSINOPHIL # BLD AUTO: 0.03 10*3/MM3 (ref 0–0.4)
EOSINOPHIL NFR BLD AUTO: 0.5 % (ref 0.3–6.2)
ERYTHROCYTE [DISTWIDTH] IN BLOOD BY AUTOMATED COUNT: 12.4 % (ref 12.3–15.4)
FERRITIN SERPL-MCNC: 71.1 NG/ML (ref 13–150)
GLOBULIN SER CALC-MCNC: 2.3 GM/DL
GLUCOSE SERPL-MCNC: 96 MG/DL (ref 65–99)
HCT VFR BLD AUTO: 47.5 % (ref 34–46.6)
HCV AB S/CO SERPL IA: <0.1 S/CO RATIO (ref 0–0.9)
HDLC SERPL-MCNC: 67 MG/DL (ref 40–60)
HGB BLD-MCNC: 15.9 G/DL (ref 12–15.9)
IMM GRANULOCYTES # BLD AUTO: 0.02 10*3/MM3 (ref 0–0.05)
IMM GRANULOCYTES NFR BLD AUTO: 0.3 % (ref 0–0.5)
IRON SATN MFR SERPL: 24 % (ref 20–50)
IRON SERPL-MCNC: 114 MCG/DL (ref 37–145)
LDLC SERPL CALC-MCNC: 102 MG/DL (ref 0–100)
LYMPHOCYTES # BLD AUTO: 1.26 10*3/MM3 (ref 0.7–3.1)
LYMPHOCYTES NFR BLD AUTO: 20.2 % (ref 19.6–45.3)
MCH RBC QN AUTO: 31.5 PG (ref 26.6–33)
MCHC RBC AUTO-ENTMCNC: 33.5 G/DL (ref 31.5–35.7)
MCV RBC AUTO: 94.2 FL (ref 79–97)
MONOCYTES # BLD AUTO: 0.63 10*3/MM3 (ref 0.1–0.9)
MONOCYTES NFR BLD AUTO: 10.1 % (ref 5–12)
NEUTROPHILS # BLD AUTO: 4.25 10*3/MM3 (ref 1.7–7)
NEUTROPHILS NFR BLD AUTO: 68.3 % (ref 42.7–76)
NRBC BLD AUTO-RTO: 0 /100 WBC (ref 0–0.2)
PLATELET # BLD AUTO: 164 10*3/MM3 (ref 140–450)
POTASSIUM SERPL-SCNC: 4.6 MMOL/L (ref 3.5–5.2)
PROT SERPL-MCNC: 6.9 G/DL (ref 6–8.5)
RBC # BLD AUTO: 5.04 10*6/MM3 (ref 3.77–5.28)
SODIUM SERPL-SCNC: 143 MMOL/L (ref 136–145)
T4 SERPL-MCNC: 7.15 MCG/DL (ref 4.5–11.7)
TIBC SERPL-MCNC: 480 MCG/DL
TRIGL SERPL-MCNC: 72 MG/DL (ref 0–150)
TSH SERPL DL<=0.005 MIU/L-ACNC: 1.1 UIU/ML (ref 0.27–4.2)
UIBC SERPL-MCNC: 366 MCG/DL (ref 112–346)
VIT B12 SERPL-MCNC: 617 PG/ML (ref 211–946)
VLDLC SERPL CALC-MCNC: 13 MG/DL (ref 5–40)
WBC # BLD AUTO: 6.23 10*3/MM3 (ref 3.4–10.8)

## 2021-05-04 NOTE — TELEPHONE ENCOUNTER
[facility] pharmacy on file will need a new RX sent over to them.       levothyroxine (SYNTHROID, LEVOTHROID) 50 MCG tablet [4421] (Order [id number])    liothyronine (CYTOMEL) 5 MCG tablet [66432] (Order [id number])

## 2021-05-05 RX ORDER — LEVOTHYROXINE SODIUM 0.05 MG/1
50 TABLET ORAL DAILY
Qty: 90 TABLET | Refills: 1 | Status: SHIPPED | OUTPATIENT
Start: 2021-05-05 | End: 2021-08-13 | Stop reason: SDUPTHER

## 2021-05-05 RX ORDER — LIOTHYRONINE SODIUM 5 UG/1
5 TABLET ORAL DAILY
Qty: 90 TABLET | Refills: 1 | Status: SHIPPED | OUTPATIENT
Start: 2021-05-05 | End: 2021-08-13 | Stop reason: SDUPTHER

## 2021-05-19 RX ORDER — VALACYCLOVIR HYDROCHLORIDE 500 MG/1
TABLET, FILM COATED ORAL
Qty: 8 TABLET | Refills: 1 | Status: SHIPPED | OUTPATIENT
Start: 2021-05-19 | End: 2022-02-11

## 2021-05-21 RX ORDER — VALACYCLOVIR HYDROCHLORIDE 500 MG/1
TABLET, FILM COATED ORAL
Qty: 8 TABLET | Refills: 1 | OUTPATIENT
Start: 2021-05-21

## 2021-08-12 ENCOUNTER — TREATMENT (OUTPATIENT)
Dept: PHYSICAL THERAPY | Facility: CLINIC | Age: 66
End: 2021-08-12

## 2021-08-12 DIAGNOSIS — Z74.09 IMPAIRED MOBILITY AND ACTIVITIES OF DAILY LIVING: ICD-10-CM

## 2021-08-12 DIAGNOSIS — Z78.9 IMPAIRED MOBILITY AND ACTIVITIES OF DAILY LIVING: ICD-10-CM

## 2021-08-12 DIAGNOSIS — M54.50 CHRONIC BILATERAL LOW BACK PAIN WITHOUT SCIATICA: Primary | ICD-10-CM

## 2021-08-12 DIAGNOSIS — G89.29 CHRONIC BILATERAL LOW BACK PAIN WITHOUT SCIATICA: Primary | ICD-10-CM

## 2021-08-12 DIAGNOSIS — M53.3 SACROILIAC DYSFUNCTION: ICD-10-CM

## 2021-08-12 PROCEDURE — DRYNDL PR CUSTOM DRY NEEDLING SELF PAY: Performed by: PHYSICAL THERAPIST

## 2021-08-12 NOTE — PROGRESS NOTES
Physical Therapy Daily Treatment Note    Patient: Marychuy De Paz   : 1955  Diagnosis/ICD-10 Code:  Chronic bilateral low back pain without sciatica [M54.5, G89.29]  Referring practitioner: No ref. provider found  Date of Initial Evaluation:  Type: THERAPY  Noted: 2020  Visit # 4      Subjective   Requesting repeat dry needling for low back pain. Increased L low back pain after moving a patient yesterday. Has a history of SI dysfunction.    Objective   TTP L5/S1, SIJ, superior glutes B  L AINN    Soft tissue was assessed at lumbopelvic region. PT noted point tenderness as well as palpable trigger points within the muscle tissue. On this date patient stated that they would like to undergo a dry needling procedure for the soft tissue dysfunction.   Patient was educated on the procedure. Patient was informed of the risks, possible adverse effects, along with the benefits of TDN.     See Exercise, Manual, and Modality Logs for complete treatment.       Assessment/Plan  Increased tenderness of bilateral lumbar paraspinals and superior glutes. Tolerated dry needling well w/o c/o pain.   Reviewed self correctives for anterior rotation of L innominate (L iso hip ext and iso hip add). Follow up as needed.         Manual Therapy:    0     mins  44366;  Therapeutic Exercise:    0     mins  13903;     Neuromuscular Suki:    0    mins  43225;    Therapeutic Activity:     0     mins  02832;     Gait Trainin     mins  97352;     Ultrasound:     0     mins  12215;    Work Hardening           0      mins 26079  Iontophoresis               0   mins 76831  Dry needling 15 mins    Timed Treatment:   0   mins   Total Treatment:     15   mins    Carmen Callahan, PT  Physical Therapist

## 2021-08-13 RX ORDER — LIOTHYRONINE SODIUM 5 UG/1
5 TABLET ORAL DAILY
Qty: 90 TABLET | Refills: 1 | Status: SHIPPED | OUTPATIENT
Start: 2021-08-13 | End: 2021-11-09

## 2021-08-13 RX ORDER — ALBUTEROL SULFATE 90 UG/1
2 AEROSOL, METERED RESPIRATORY (INHALATION) EVERY 4 HOURS PRN
Qty: 18 G | Refills: 1 | Status: SHIPPED | OUTPATIENT
Start: 2021-08-13

## 2021-08-13 RX ORDER — LEVOTHYROXINE SODIUM 0.05 MG/1
50 TABLET ORAL DAILY
Qty: 90 TABLET | Refills: 1 | Status: SHIPPED | OUTPATIENT
Start: 2021-08-13 | End: 2021-11-20

## 2021-08-13 NOTE — TELEPHONE ENCOUNTER
Caller: Marychuy De Paz    Relationship: Self    Best call back number: [phone]    Medication needed:   Requested Prescriptions     Pending Prescriptions Disp Refills   • levothyroxine (SYNTHROID, LEVOTHROID) 50 MCG tablet 90 tablet 1     Sig: Take 1 tablet by mouth Daily.   • albuterol sulfate  (90 Base) MCG/ACT inhaler       Sig: Inhale 2 puffs Every 4 (Four) Hours As Needed for Wheezing.   • liothyronine (CYTOMEL) 5 MCG tablet 90 tablet 1     Sig: Take 1 tablet by mouth Daily.       When do you need the refill by: ASAP     What additional details did the patient provide when requesting the medication: PATIENT ADVISED BY INSURANCE TO CHANGE PHARMACIES AND REQUEST 90 DAY SUPPLY     Does the patient have less than a 3 day supply:  [x] Yes  [] No    What is the patient's preferred pharmacy: [facility] - [address] [phone] [facility] [phone] FX

## 2021-08-17 NOTE — TELEPHONE ENCOUNTER
Rx Refill Note  Requested Prescriptions     Pending Prescriptions Disp Refills   • amLODIPine (NORVASC) 5 MG tablet 90 tablet 1     Sig: Take 1 tablet by mouth Daily.      Last office visit with prescribing clinician: 4/9/2021      Next office visit with prescribing clinician: 4/11/2022            Meghann Jenkins LPN  08/17/21, 11:28 EDT

## 2021-08-18 RX ORDER — AMLODIPINE BESYLATE 5 MG/1
5 TABLET ORAL DAILY
Qty: 90 TABLET | Refills: 1 | Status: SHIPPED | OUTPATIENT
Start: 2021-08-18 | End: 2022-01-31

## 2021-08-18 RX ORDER — PROGESTERONE 100 MG/1
100 CAPSULE ORAL DAILY
Qty: 90 CAPSULE | Refills: 0 | Status: SHIPPED | OUTPATIENT
Start: 2021-08-18 | End: 2021-09-14 | Stop reason: SDUPTHER

## 2021-08-26 ENCOUNTER — TELEPHONE (OUTPATIENT)
Dept: FAMILY MEDICINE CLINIC | Facility: CLINIC | Age: 66
End: 2021-08-26

## 2021-08-26 DIAGNOSIS — R10.9 FLANK PAIN: Primary | ICD-10-CM

## 2021-08-26 DIAGNOSIS — R10.30 LOWER ABDOMINAL PAIN: ICD-10-CM

## 2021-08-26 DIAGNOSIS — R31.9 HEMATURIA, UNSPECIFIED TYPE: ICD-10-CM

## 2021-08-26 RX ORDER — LEVOFLOXACIN 750 MG/1
750 TABLET ORAL DAILY
Qty: 5 TABLET | Refills: 0 | Status: SHIPPED | OUTPATIENT
Start: 2021-08-26 | End: 2022-04-11

## 2021-08-26 NOTE — TELEPHONE ENCOUNTER
Patient has been advised if she is in severe pain it would be best for her to be evaluated at the ER, per Dr. Licona.

## 2021-08-26 NOTE — TELEPHONE ENCOUNTER
PATIENT IS ASKING IF SHE COULD GET AN ORDER FOR A CT SCAN. SHE WAS HURTING BAD ON Monday AND WAS SEEN AT URGENT CARE AND SHE HAD BLOOD IN URINE . SHE WORKS [employer] AND THEY CAN GET HER IN FOR A CT SCAN TODAY . SHE IS IN REALLY BAD PAIN.     PLEASE CALL ASAP.   [phone]

## 2021-08-26 NOTE — PROGRESS NOTES
Spoke with pt. Not feeling well and not improving with intervention to date. She was evaluated curbside by Dr. Kidd because not feeling well. I spoke with Dr. Kidd and he said she looked like she did not feel well. He said he was concerned about her flank pain, symptoms without improvement on current therapy and based on his exam thought could have stone vs pyelo. He recommended going to ER or being seen through PCP.   We are going to order scan to evaluate further, full UA still pending but will upgrade abx therapy. May still need admission.   
numerical 0-10

## 2021-08-26 NOTE — TELEPHONE ENCOUNTER
Patient came into the office requesting that an order gets put in for a CT scan to check for kidney stones. She says she is in very bad pain and would like to make an appointment ASAP.

## 2021-09-14 ENCOUNTER — OFFICE VISIT (OUTPATIENT)
Dept: OBSTETRICS AND GYNECOLOGY | Facility: CLINIC | Age: 66
End: 2021-09-14

## 2021-09-14 VITALS
DIASTOLIC BLOOD PRESSURE: 86 MMHG | HEIGHT: 65 IN | WEIGHT: 182.6 LBS | BODY MASS INDEX: 30.42 KG/M2 | SYSTOLIC BLOOD PRESSURE: 132 MMHG

## 2021-09-14 DIAGNOSIS — Z01.419 PAP SMEAR, LOW-RISK: ICD-10-CM

## 2021-09-14 DIAGNOSIS — Z13.9 SCREENING FOR CONDITION: ICD-10-CM

## 2021-09-14 DIAGNOSIS — R35.0 URINARY FREQUENCY: ICD-10-CM

## 2021-09-14 DIAGNOSIS — Z11.51 SCREENING FOR HPV (HUMAN PAPILLOMAVIRUS): ICD-10-CM

## 2021-09-14 DIAGNOSIS — Z01.419 WELL WOMAN EXAM: Primary | ICD-10-CM

## 2021-09-14 DIAGNOSIS — T83.711A EROSION OF IMPLANTED VAGINAL MESH TO SURROUNDING TISSUE, INITIAL ENCOUNTER (HCC): ICD-10-CM

## 2021-09-14 LAB
BILIRUB BLD-MCNC: NEGATIVE MG/DL
CLARITY, POC: CLEAR
COLOR UR: YELLOW
GLUCOSE UR STRIP-MCNC: NEGATIVE MG/DL
KETONES UR QL: NEGATIVE
LEUKOCYTE EST, POC: NEGATIVE
NITRITE UR-MCNC: NEGATIVE MG/ML
PH UR: 5 [PH] (ref 5–8)
PROT UR STRIP-MCNC: NEGATIVE MG/DL
RBC # UR STRIP: NEGATIVE /UL
SP GR UR: 1 (ref 1–1.03)
UROBILINOGEN UR QL: NORMAL

## 2021-09-14 PROCEDURE — 99396 PREV VISIT EST AGE 40-64: CPT | Performed by: OBSTETRICS & GYNECOLOGY

## 2021-09-14 PROCEDURE — 81002 URINALYSIS NONAUTO W/O SCOPE: CPT | Performed by: OBSTETRICS & GYNECOLOGY

## 2021-09-14 RX ORDER — PROGESTERONE 100 MG/1
100 CAPSULE ORAL DAILY
Qty: 90 CAPSULE | Refills: 3 | Status: SHIPPED | OUTPATIENT
Start: 2021-09-14 | End: 2021-09-28 | Stop reason: SDUPTHER

## 2021-09-14 RX ORDER — POLYMYXIN B SULFATE AND TRIMETHOPRIM 1; 10000 MG/ML; [USP'U]/ML
SOLUTION OPHTHALMIC
COMMUNITY
Start: 2021-08-31 | End: 2022-04-11

## 2021-09-14 RX ORDER — OXYBUTYNIN CHLORIDE 10 MG/1
10 TABLET, EXTENDED RELEASE ORAL DAILY
Qty: 30 TABLET | Refills: 2 | Status: SHIPPED | OUTPATIENT
Start: 2021-09-14 | End: 2021-09-28 | Stop reason: SDUPTHER

## 2021-09-14 NOTE — PROGRESS NOTES
GYN Annual Exam     CC- Here for annual exam.     Marychuy De Paz is a 65 y.o. female who presents for annual well woman exam. Pt had a FRANCIE/BSO in  due to large mass of uterus or ovary. The mass caused damage to her bladder and she has had 3 bladder surgeries.  She had mesh erosion and has had extensive surgery to remove it. She is having a lot of urinary urgency and frequency. She has never been on bladder meds.  Was started on Prometrium last year. She had been on estrogen, testosterone, progesterone.  She was still reporting difficulty sleeping and vasomotor symptoms and was therefore started on the Prometrium.  She would like to continue that.  She states that she still waking up at night due to overactive bladder complaints.  Patient was started on estrogen cream last year due to dyspareunia and states that she takes it once in a while but not regularly.    OB History        2    Para   2    Term   2            AB        Living           SAB        TAB        Ectopic        Molar        Multiple        Live Births                    Current contraception: status post hysterectomy  History of abnormal Pap smear: no  History of abnormal mammogram: yes - remote- cyst aspirated.  Family history of uterine, colon or ovarian cancer: no  Family history of breast cancer: yes - mother diagnosed at age 84    Health Maintenance   Topic Date Due   • TDAP/TD VACCINES (1 - Tdap) Never done   • ZOSTER VACCINE (1 of 2) Never done   • COLORECTAL CANCER SCREENING  2018   • Annual Gynecologic Pelvic and Breast Exam  2021   • INFLUENZA VACCINE  10/01/2021   • LIPID PANEL  2022   • ANNUAL PHYSICAL  04/10/2022   • MAMMOGRAM  12/10/2022   • PAP SMEAR  2023   • HEPATITIS C SCREENING  Completed   • COVID-19 Vaccine  Completed   • Pneumococcal Vaccine 0-64  Aged Out       Past Medical History:   Diagnosis Date   • Anemia    • Breast cyst    • Disease of thyroid gland    • Diverticulosis of colon  "09/05/2008   • Enterocele    • Fall 1970    fractured Sacrum    • Fall 2009   • Female cystocele    • History of anemia    • History of blood clots     LT LEG X2 AFTER INJECTION FOR SPIDER VEINS   • History of transfusion 1977    AFTER [facility]   • Hypothyroidism    • Incarcerated hiatal hernia 9/20/2016   • Lichen planopilaris of vulva    • Rectocele    • Stress incontinence, female        Past Surgical History:   Procedure Laterality Date   • ANTERIOR AND POSTERIOR VAGINAL REPAIR N/A 2/22/2019    Procedure: POSTERIOR COLPORRHAPHY VAGINAL ENTEROCELE REPAIR;  Surgeon: Kaci Goodman MD;  Location: [facility] OR;  Service: Gynecology   • BLADDER REPAIR      x2   • BREAST CYST ASPIRATION     • BUNIONECTOMY Bilateral 1999   • HYSTERECTOMY      FULL   • OOPHORECTOMY     • SC LAP,ESOPHAGOGAST FUNDOPLASTY N/A 9/21/2016    Procedure: HIATAL HERNIA REPAIR LAPAROSCOPIC WITH MESH;  Surgeon: Donaldo Nuñez MD;  Location: [facility] OR;  Service: General   • PUBOVAGINAL SLING N/A 2/22/2019    Procedure: PUBO VAGINAL SLING REMOVAL AND REVISION OF SLING WITH CYSTOURETHROSCOPY;  Surgeon: Kaci Goodman MD;  Location: [facility] OR;  Service: Gynecology   • SHOULDER ROTATOR CUFF REPAIR Left 04/2014   • TONSILLECTOMY     • TONSILLECTOMY  09/01/1969   • UTEROSACRAL LIGAMENT TRANSECTION N/A 2/22/2019    Procedure: SACROSPINOUS LIGAMENT FIXATION EXTRAPERITONEAL COLPOPEXY, INSERTION OF VAGINAL GRAFT ANTERIOR AND POSTERIOR, PUBOVAGINAL SLING;  Surgeon: Kaci Goodman MD;  Location: [facility];  Service: Gynecology         Current Outpatient Medications:   •  albuterol sulfate  (90 Base) MCG/ACT inhaler, Inhale 2 puffs Every 4 (Four) Hours As Needed for Wheezing., Disp: 18 g, Rfl: 1  •  amLODIPine (NORVASC) 5 MG tablet, Take 1 tablet by mouth Daily., Disp: 90 tablet, Rfl: 1  •  B-D ALLERGY SYRINGE 1CC/28G 28G X 1/2\" 1 ML misc, USE UTD, Disp: , Rfl:   •  Chlorphen-PSE-Atrop-Hyos-Scop (STAHIST PO), Take 1 tablet by mouth " As Needed (ALLERGIES)., Disp: , Rfl:   •  coenzyme Q10 100 MG capsule, Take 100 mg by mouth Daily. DUE TO STOP 7 DAYS PRIOR TO SURGERY, Disp: , Rfl:   •  EPINEPHrine (EPIPEN) 0.3 MG/0.3ML solution auto-injector injection, INJECT INTRAMUSCULARLY AS DIRECTED, Disp: , Rfl: 0  •  estradiol (ESTRACE) 0.1 MG/GM vaginal cream, Insert 1 g into the vagina 2 (Two) Times a Week., Disp: 45 g, Rfl: 6  •  Lactobacillus (ULTIMATE PROBIOTIC FORMULA PO), Take 1 tablet by mouth Daily., Disp: , Rfl:   •  levocetirizine (XYZAL) 5 MG tablet, , Disp: , Rfl:   •  levoFLOXacin (LEVAQUIN) 750 MG tablet, Take 1 tablet by mouth Daily., Disp: 5 tablet, Rfl: 0  •  levothyroxine (SYNTHROID, LEVOTHROID) 50 MCG tablet, Take 1 tablet by mouth Daily., Disp: 90 tablet, Rfl: 1  •  liothyronine (CYTOMEL) 5 MCG tablet, Take 1 tablet by mouth Daily., Disp: 90 tablet, Rfl: 1  •  magnesium gluconate (MAGONATE) 500 MG tablet, Take 500 mg by mouth Daily. DUE TO STOP 7 DAYS PRIOR TO SURGERY, Disp: , Rfl:   •  Multiple Vitamins-Minerals (ENERGY BOOSTER PO), Take 1 tablet by mouth Daily. DUE TO STOP 7 DAYS PRIOR TO SURGERY, Disp: , Rfl:   •  NON FORMULARY, Inject 1 dose under the skin into the appropriate area as directed 2 (Two) Times a Week. Allergy shot, Disp: , Rfl:   •  oxybutynin XL (DITROPAN-XL) 10 MG 24 hr tablet, Take 1 tablet by mouth Daily., Disp: 30 tablet, Rfl: 2  •  progesterone (Prometrium) 100 MG capsule, Take 1 capsule by mouth Daily., Disp: 90 capsule, Rfl: 3  •  Progesterone (PROMETRIUM) 100 MG capsule, Take 1 capsule by mouth Daily., Disp: 90 capsule, Rfl: 3  •  Triamcinolone Acetonide (NASACORT) 55 MCG/ACT nasal inhaler, U 2 SPRAYS IEN QD, Disp: , Rfl:   •  trimethoprim-polymyxin b (POLYTRIM) 86434-2.1 UNIT/ML-% ophthalmic solution, , Disp: , Rfl:   •  valACYclovir (VALTREX) 500 MG tablet, TAKE 2 TABLETS BY MOUTH TWICE DAILY AT ONSET OF FEVER BLISTERS FOR 1 DAY, Disp: 8 tablet, Rfl: 1    Allergies   Allergen Reactions   • Pollen Extract  "Irritability       Social History     Tobacco Use   • Smoking status: Never Smoker   • Smokeless tobacco: Never Used   Substance Use Topics   • Alcohol use: No   • Drug use: No       Family History   Problem Relation Age of Onset   • Breast cancer Mother    • Hypertension Mother    • Hypothyroidism Mother    • Lung cancer Father    • Hypertension Sister    • Kidney cancer Sister    • Tongue cancer Sister    • Diabetes Brother    • Prostate cancer Brother    • Breast cancer Maternal Aunt    • Breast cancer Maternal Aunt    • Breast cancer Maternal Aunt    • Malig Hyperthermia Neg Hx        Review of Systems   Constitutional: Negative for appetite change, chills, fatigue, fever and unexpected weight change.   Gastrointestinal: Negative for abdominal distention, abdominal pain, anal bleeding, blood in stool, constipation, diarrhea, nausea and vomiting.   Genitourinary: Positive for frequency. Negative for dyspareunia, dysuria, menstrual problem, pelvic pain, vaginal bleeding, vaginal discharge and vaginal pain.       /86   Ht 165.1 cm (65\")   Wt 82.8 kg (182 lb 9.6 oz)   LMP  (LMP Unknown)   BMI 30.39 kg/m²     Physical Exam  Vitals reviewed.   Constitutional:       Appearance: She is well-developed.   HENT:      Mouth/Throat:      Dentition: Normal dentition. No dental caries.   Cardiovascular:      Rate and Rhythm: Normal rate and regular rhythm.      Heart sounds: Normal heart sounds.   Pulmonary:      Effort: Pulmonary effort is normal. No respiratory distress.      Breath sounds: Normal breath sounds. No stridor. No wheezing.   Chest:      Breasts:         Right: No inverted nipple, mass, nipple discharge, skin change or tenderness.         Left: No inverted nipple, mass, nipple discharge, skin change or tenderness.   Abdominal:      General: There is no distension.      Palpations: Abdomen is soft. There is no mass.      Tenderness: There is no abdominal tenderness.   Genitourinary:     General: Normal " vulva.      Labia:         Right: No rash, tenderness or lesion.         Left: No rash, tenderness or lesion.       Vagina: Foreign body present. No vaginal discharge, tenderness or bleeding.      Uterus: Absent.       Adnexa:         Right: No mass, tenderness or fullness.          Left: No mass, tenderness or fullness.        Comments: Mesh erosion noted on left and right sides under urethra  Musculoskeletal:         General: No tenderness. Normal range of motion.   Skin:     General: Skin is warm.      Findings: No erythema or rash.   Neurological:      General: No focal deficit present.      Mental Status: She is alert and oriented to person, place, and time.      Cranial Nerves: No cranial nerve deficit.      Motor: No weakness.      Coordination: Coordination normal.      Gait: Gait normal.   Psychiatric:         Mood and Affect: Mood normal.         Behavior: Behavior normal.         Thought Content: Thought content normal.         Judgment: Judgment normal.            Assessment/Plan      1) GYN HM: Check pap smear of vaginal cuff.   SBE demonstrated and encouraged.  2) : Doing well on Prometrium and refills given.  3) Bone health - Weight bearing exercise, dietary calcium recommendations and vitamin D reviewed. Family hx of osteoporosis: mother. Check Bone density. Normal 2015.  4) Diet and Exercise discussed  5) Smoking Status: nonsmoker  6) Social: Patient works in [employer] at [employer]  7) urinary urgency and frequency: Patient has had extensive bladder surgery including removal of mesh and repair of her bladder.  Patient is still up several times at night with overactive bladder symptoms.  Will start oxybutynin 10 mg p.o. every 24 hours to see if this helps to relieve some of her symptoms.    8) Dyspareunia/mesh erosion: Patient instructed to use estrogen cream 2-3 times a week to help with the erosion that was felt on exam today.  We will follow up with patient in approximately 3 months to see if  the erosion is improved.  Patient may need to see urogynecology again.  9) Lichen sclerosis: Rx Clobetasol daily x 2 weeks prn.  Patient is asymptomatic today but evidence of lichen sclerosis persist.  10) Follow up prn and 1 year       Diagnoses and all orders for this visit:    Well woman exam  -     IgP, Aptima HPV    Screening for condition  -     POC Urinalysis Dipstick    Pap smear, low-risk  -     IgP, Aptima HPV    Screening for HPV (human papillomavirus)  -     IgP, Aptima HPV    Urinary frequency  -     Urinalysis With Microscopic - Urine, Clean Catch  -     Urine Culture - Urine, Urine, Clean Catch    Erosion of implanted vaginal mesh to surrounding tissue, initial encounter (CMS/[facility])    Other orders  -     trimethoprim-polymyxin b (POLYTRIM) 87198-8.1 UNIT/ML-% ophthalmic solution  -     oxybutynin XL (DITROPAN-XL) 10 MG 24 hr tablet; Take 1 tablet by mouth Daily.  -     Progesterone (PROMETRIUM) 100 MG capsule; Take 1 capsule by mouth Daily.        Amy Leonardo DO  9/15/2021  18:51 EDT

## 2021-09-15 NOTE — TELEPHONE ENCOUNTER
Will you please let pt know that I want there to use the estrogen cream on Mon, Wed and Friday nights and see me again for an exam to see if there is any improvement in her mesh erosion.

## 2021-09-16 ENCOUNTER — HOSPITAL ENCOUNTER (OUTPATIENT)
Dept: CT IMAGING | Facility: HOSPITAL | Age: 66
Discharge: HOME | End: 2021-09-16
Admitting: FAMILY MEDICINE

## 2021-09-16 DIAGNOSIS — R10.30 LOWER ABDOMINAL PAIN: ICD-10-CM

## 2021-09-16 DIAGNOSIS — R10.9 FLANK PAIN: ICD-10-CM

## 2021-09-16 DIAGNOSIS — R31.9 HEMATURIA, UNSPECIFIED TYPE: ICD-10-CM

## 2021-09-16 LAB
APPEARANCE UR: CLEAR
BACTERIA #/AREA URNS HPF: ABNORMAL /HPF
BACTERIA UR CULT: NORMAL
BACTERIA UR CULT: NORMAL
BILIRUB UR QL STRIP: NEGATIVE
COLOR UR: YELLOW
CYTOLOGIST CVX/VAG CYTO: NORMAL
CYTOLOGY CVX/VAG DOC CYTO: NORMAL
CYTOLOGY CVX/VAG DOC THIN PREP: NORMAL
DX ICD CODE: NORMAL
EPI CELLS #/AREA URNS HPF: ABNORMAL /HPF
GLUCOSE UR QL: NEGATIVE
HGB UR QL STRIP: NEGATIVE
HIV 1 & 2 AB SER-IMP: NORMAL
HPV I/H RISK 4 DNA CVX QL PROBE+SIG AMP: NEGATIVE
KETONES UR QL STRIP: NEGATIVE
LEUKOCYTE ESTERASE UR QL STRIP: ABNORMAL
MUCOUS THREADS URNS QL MICRO: ABNORMAL /HPF
NITRITE UR QL STRIP: NEGATIVE
OTHER STN SPEC: NORMAL
PH UR STRIP: 6 [PH] (ref 5–8)
PROT UR QL STRIP: NEGATIVE
RBC #/AREA URNS HPF: ABNORMAL /HPF
SP GR UR: 1.02 (ref 1–1.03)
STAT OF ADQ CVX/VAG CYTO-IMP: NORMAL
UROBILINOGEN UR STRIP-MCNC: ABNORMAL MG/DL
WBC #/AREA URNS HPF: ABNORMAL /HPF

## 2021-09-16 PROCEDURE — 74176 CT ABD & PELVIS W/O CONTRAST: CPT

## 2021-09-21 RX ORDER — ESTRADIOL 0.1 MG/G
1 CREAM VAGINAL 2 TIMES WEEKLY
Qty: 45 G | Refills: 6 | Status: SHIPPED | OUTPATIENT
Start: 2021-09-23 | End: 2022-05-28

## 2021-09-28 RX ORDER — PROGESTERONE 100 MG/1
100 CAPSULE ORAL DAILY
Qty: 90 CAPSULE | Refills: 0 | Status: SHIPPED | OUTPATIENT
Start: 2021-09-28 | End: 2022-04-11

## 2021-09-28 RX ORDER — OXYBUTYNIN CHLORIDE 10 MG/1
10 TABLET, EXTENDED RELEASE ORAL DAILY
Qty: 90 TABLET | Refills: 0 | Status: SHIPPED | OUTPATIENT
Start: 2021-09-28 | End: 2022-04-11

## 2021-11-05 ENCOUNTER — TRANSCRIBE ORDERS (OUTPATIENT)
Dept: ADMINISTRATIVE | Facility: HOSPITAL | Age: 66
End: 2021-11-05

## 2021-11-05 DIAGNOSIS — M25.512 LEFT SHOULDER PAIN, UNSPECIFIED CHRONICITY: Primary | ICD-10-CM

## 2021-11-08 NOTE — TELEPHONE ENCOUNTER
Rx Refill Note  Requested Prescriptions     Pending Prescriptions Disp Refills   • liothyronine (CYTOMEL) 5 MCG tablet [Pharmacy Med Name: LIOTHYRONINE SODIUM TABS 5MCG] 90 tablet 3     Sig: TAKE 1 TABLET DAILY      Last office visit with prescribing clinician: 4/9/2021      Next office visit with prescribing clinician: 4/11/2022            Meghann Jenkins LPN  11/08/21, 10:59 EST

## 2021-11-09 RX ORDER — LIOTHYRONINE SODIUM 5 UG/1
TABLET ORAL
Qty: 90 TABLET | Refills: 3 | Status: SHIPPED | OUTPATIENT
Start: 2021-11-09 | End: 2022-11-14 | Stop reason: SDUPTHER

## 2021-11-11 ENCOUNTER — HOSPITAL ENCOUNTER (OUTPATIENT)
Dept: MRI IMAGING | Facility: HOSPITAL | Age: 66
Discharge: HOME | End: 2021-11-11
Admitting: ORTHOPAEDIC SURGERY

## 2021-11-11 DIAGNOSIS — M25.512 LEFT SHOULDER PAIN, UNSPECIFIED CHRONICITY: ICD-10-CM

## 2021-11-11 PROCEDURE — 73221 MRI JOINT UPR EXTREM W/O DYE: CPT

## 2021-11-15 ENCOUNTER — TRANSCRIBE ORDERS (OUTPATIENT)
Dept: ADMINISTRATIVE | Facility: HOSPITAL | Age: 66
End: 2021-11-15

## 2021-11-15 DIAGNOSIS — Z12.31 ENCOUNTER FOR SCREENING MAMMOGRAM FOR MALIGNANT NEOPLASM OF BREAST: Primary | ICD-10-CM

## 2021-11-16 ENCOUNTER — TRANSCRIBE ORDERS (OUTPATIENT)
Dept: PHYSICAL THERAPY | Facility: CLINIC | Age: 66
End: 2021-11-16

## 2021-11-16 DIAGNOSIS — M50.30 DDD (DEGENERATIVE DISC DISEASE), CERVICAL: Primary | ICD-10-CM

## 2021-11-19 NOTE — TELEPHONE ENCOUNTER
Rx Refill Note  Requested Prescriptions     Pending Prescriptions Disp Refills   • levothyroxine (SYNTHROID, LEVOTHROID) 50 MCG tablet [Pharmacy Med Name: L-THYROXINE (SYNTHROID) TABS 50MCG] 90 tablet 3     Sig: TAKE 1 TABLET DAILY      Last office visit with prescribing clinician: 4/9/2021      Next office visit with prescribing clinician: 4/11/2022            Meghann Jenkins LPN  11/19/21, 15:37 EST

## 2021-11-20 RX ORDER — LEVOTHYROXINE SODIUM 0.05 MG/1
TABLET ORAL
Qty: 90 TABLET | Refills: 3 | Status: SHIPPED | OUTPATIENT
Start: 2021-11-20 | End: 2022-11-14 | Stop reason: SDUPTHER

## 2021-11-30 ENCOUNTER — APPOINTMENT (OUTPATIENT)
Dept: MRI IMAGING | Facility: HOSPITAL | Age: 66
End: 2021-11-30

## 2021-12-03 ENCOUNTER — TREATMENT (OUTPATIENT)
Dept: PHYSICAL THERAPY | Facility: CLINIC | Age: 66
End: 2021-12-03

## 2021-12-03 DIAGNOSIS — R68.89 IMPAIRED FUNCTION OF UPPER EXTREMITY: ICD-10-CM

## 2021-12-03 DIAGNOSIS — M79.602 PAIN OF LEFT UPPER EXTREMITY: ICD-10-CM

## 2021-12-03 DIAGNOSIS — M54.2 PAIN, NECK: Primary | ICD-10-CM

## 2021-12-03 PROCEDURE — 97530 THERAPEUTIC ACTIVITIES: CPT | Performed by: PHYSICAL THERAPIST

## 2021-12-03 PROCEDURE — 97162 PT EVAL MOD COMPLEX 30 MIN: CPT | Performed by: PHYSICAL THERAPIST

## 2021-12-03 PROCEDURE — 97110 THERAPEUTIC EXERCISES: CPT | Performed by: PHYSICAL THERAPIST

## 2021-12-03 PROCEDURE — 20560 NDL INSJ W/O NJX 1 OR 2 MUSC: CPT | Performed by: PHYSICAL THERAPIST

## 2021-12-03 NOTE — PROGRESS NOTES
Physical Therapy Initial Evaluation and Plan of Care    Patient: Marychuy De Paz   : 1955  Diagnosis/ICD-10 Code:  Pain, neck [M54.2]  Referring practitioner: Deejay Ford MD  Date of Initial Visit: 12/3/2021  Today's Date: 12/3/2021  Patient seen for 1 session         Visit Diagnoses:    ICD-10-CM ICD-9-CM   1. Pain, neck  M54.2 723.1   2. Impaired function of upper extremity  R68.89 V49.5   3. Pain of left upper extremity  M79.602 729.5         Subjective Questionnaire: NDI:42% perceived disability      Subjective Evaluation    History of Present Illness  Mechanism of injury: Pt notes L sided neck/sh pain with intermittent L arm numbness over the last year.  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      I'm having difficulty with OH/reaching back activities with my LUE; trying to paint my camper.      I'm no longer able to do light UE workouts with wts at home    Pt denies tinnitus/diplopia    Has been trying to get into PT but there have not been any openings until now.  I've had PT here before for my low back and the dry needling really helped me and would like to try that with my neck.     PMH - lumbar DDD with radiation to L hip, hx of L shoulder RCR/biceps tenotomy/acromioplasty in       Patient Occupation: Radiation Tech Pain  Current pain ratin  At worst pain ratin  Location: L C-S/T-S and radiates down L bicep tendon as well as into scap  Quality: crunching/grinding.  Alleviating factors: medrol castellanos pack helped.  Aggravating factors: sleeping, lifting, outstretched reach and overhead activity (laying down/resting)  Symptom course: improved with dose pack but is worsening again.    Hand dominance: left    Treatments  Previous treatment: medication  Patient Goals  Patient  "goals for therapy: increased strength and decreased pain  Patient goal: return to prior level of function           Objective          Postural Observations    Additional Postural Observation Details  L sh elevated    Palpation   Left   Hypertonic in the levator scapulae and upper trapezius.   Tenderness of the levator scapulae and upper trapezius.     Tenderness   Cervical Spine   Tenderness in the facet joint (L C3-7), spinous process (C7, T2) and left 1st rib (elevated).     Neurological Testing     Sensation   Cervical/Thoracic   Left   Intact: light touch    Right   Intact: light touch    Active Range of Motion   Cervical/Thoracic Spine   Cervical    Flexion: 35 degrees   Extension: 50 degrees   Left lateral flexion: 35 (B UT pain with L sided \"crunching\") degrees with pain  Right lateral flexion: 35 (L UT pain) degrees with pain  Left rotation: 68 (compensatory trunk rotn) degrees with pain  Right rotation: 85 degrees     Strength/Myotome Testing     Left Shoulder     Planes of Motion   Flexion: 4+ (neck pain)   Abduction: 4 (neck pain)     Right Shoulder     Planes of Motion   Flexion: 4+ (neck pain)   Abduction: 4 (neck pain)     Left Elbow   Flexion: 4+  Extension: 5    Right Elbow   Flexion: 5  Extension: 5    Left Wrist/Hand   Wrist extension: 5  Wrist flexion: 5     (2nd hand position)   lbs: 55    Right Wrist/Hand   Wrist extension: 5  Wrist flexion: 5     (2nd hand position)   lbs: 75    Additional Strength Details  Intrinsics 5/5    Tests   Cervical     Left   Positive cervical distraction, Spurling's sign and ULTT1.     Left Shoulder   Positive Hawkin's and Speed's.     Additional Tests Details  - vert a            Assessment & Plan     Assessment  Impairments: abnormal or restricted ROM, activity intolerance, impaired physical strength, lacks appropriate home exercise program and pain with function  Functional Limitations: carrying objects, lifting, sleeping, pulling, pushing, uncomfortable " because of pain, moving in bed and reaching overhead  Assessment details: Marychuy De Paz is a 65 y.o. female referred to physical therapy for L cervical radiculopathy. She presents with decreased/painful cervical ROM, palp tenderness/muscle guarding, elevated L sh, decreased UE MMT with associated pain, elevated/tender L 1st rib, relief with manual traction, + neural tension and significant sleep disturbance. Pt would benefit from skilled PT services in order to address listed impairments and increase tolerance to normal daily activities including ADLs, work and recreational activities.  During evaluation, pt educated on anatomy, goal of interventions, posture, and body mechanics to promote healthy lifestyle and improve quality of life.    On this date, pt expressed interest in receiving dry needling. After reviewing all risk of dry needling (including pneumothorax, bruising, infection, nerve injury, and soreness), written informed consent was obtained.  Manual palpation and assessment performed before, during and after dry needling session.  Clean needle technique observed at all times, precautions for lung fields, neurovascular structures observed.  Patient educated in purpose and procedure. Improved subjective reports, mobility, and muscle guarding after Dry Needling attempts. Pt tolerated treatment well and did not report any adverse response.  Pt educated to avoid anti-inflammatories today and to use MH this evening.             Prognosis: good    Goals  Plan Goals: STG In 4 weeks  1. Pt to be independent with HEP  2. Pt to exhibit increased cervical segmental mobility to allow for increased AROM to allow for greater ease in turning head while driving.  3. Pt to present with normal L 1st rib positioning to allow for decreased radicular complaints and incidence of HA  4. Pt to report decreased pain with ADL's not > 6/10    LTG In 8 weeks  1. Pt to exhibit 80 degrees of cervical L rotn to allow for viewing  traffic without pain or limitations  2. Pt to demonstrate good postural awareness to allow for greater tolerance to prolonged sitting  3. Pt to report ability to sleep through the night without awakening  4. Pt able to perform ADL's and recreational activities without pain > 2/10   5. Pt to demonstrate the use of proper body mechanics and posture  when performing her job to eliminate/reduce her symptoms  6. Patient to sleep uninterrupted for six hours without being awakened by her  pain to allow her to perform ADLs  7. NDI < 25% to demonstrate improved functional tolerance to ADLs.     Plan  Therapy options: will be seen for skilled therapy services  Planned modality interventions: ultrasound, traction, dry needling, cryotherapy, thermotherapy (hydrocollator packs) and TENS  Planned therapy interventions: therapeutic activities, stretching, strengthening, home exercise program and manual therapy  Frequency: 2x week  Duration in weeks: 8  Treatment plan discussed with: patient        History # of Personal Factors and/or Comorbidities: MODERATE (1-2)  Examination of Body System(s): # of elements: MODERATE (3)  Clinical Presentation: STABLE   Clinical Decision Making: MODERATE      Timed:         Manual Therapy:    -     mins  17877;     Therapeutic Exercise:    10     mins  08372;     Neuromuscular Suki:    -    mins  59916;    Therapeutic Activity:     8     mins  40682;     Gait Training:      -     mins  54151;     Ultrasound:     -     mins  81993;    Ionto                               -    mins   32261  Self Care                       -     mins   04835    Un-Timed:  Electrical Stimulation:    -     mins  07863 ( );  Dry Needling     15     mins self-pay  Traction     -     mins 56314  Low Eval     -     Mins  47168  Mod Eval     20     Mins  87853  High Eval                       -     Mins  52914    Timed Treatment:   23   mins   Total Treatment:     65   mins    PT: Karley Grubbs PT     [place]  License # [id number]    Electronically signed by Karley Grubbs, PT, 12/03/21, 12:45 PM EST    Certification Period: 12/3/2021 thru 3/2/2022  I certify that the therapy services are furnished while this patient is under my care.  The services outlined above are required by this patient, and will be reviewed every 90 days.         Physician Signature:__________________________________________________    PHYSICIAN: Deejay Ford MD      DATE:                       Please sign and return via fax to .[phone]. Thank you, [facility].

## 2021-12-03 NOTE — PATIENT INSTRUCTIONS
Access Code: [email or web address]  URL: [email or web address]  Date: 12/03/2021  Prepared by: Karley Grubbs    Exercises  Shoulder External Rotation and Scapular Retraction - 2 x daily - 1 sets - 15 reps - 5 second hold  Thoracic Extension Mobilization with Noodle - 1 x daily - 1 sets - 10 reps - 5 hold  Median Nerve Flossing - 2 x daily - 1 sets - 10 reps - 5 hold  First Rib Mobilization with Strap - 2 x daily - 1 sets - 10 reps - 10 hold

## 2021-12-14 ENCOUNTER — TREATMENT (OUTPATIENT)
Dept: PHYSICAL THERAPY | Facility: CLINIC | Age: 66
End: 2021-12-14

## 2021-12-14 DIAGNOSIS — M54.2 PAIN, NECK: Primary | ICD-10-CM

## 2021-12-14 DIAGNOSIS — M79.602 PAIN OF LEFT UPPER EXTREMITY: ICD-10-CM

## 2021-12-14 DIAGNOSIS — R68.89 IMPAIRED FUNCTION OF UPPER EXTREMITY: ICD-10-CM

## 2021-12-14 PROCEDURE — 97110 THERAPEUTIC EXERCISES: CPT | Performed by: PHYSICAL THERAPIST

## 2021-12-14 PROCEDURE — 97140 MANUAL THERAPY 1/> REGIONS: CPT | Performed by: PHYSICAL THERAPIST

## 2021-12-14 NOTE — PROGRESS NOTES
Physical Therapy Daily Treatment Note      Patient: Marychuy De Paz   : 1955  Referring practitioner: Deejay Ford MD  Date of Initial Visit: Type: THERAPY  Noted: 12/3/2021  Today's Date: 2021  Patient seen for 2 sessions       Visit Diagnoses:    ICD-10-CM ICD-9-CM   1. Pain, neck  M54.2 723.1   2. Impaired function of upper extremity  R68.89 V49.5   3. Pain of left upper extremity  M79.602 729.5       Subjective   I think medrol scott kicked in at about the same time I started PT. Feels like there is less swelling at base of neck. Base of neck is sore by the end of the day. I can no longer lay my left arm across my . I have to hold arm close to body. It really hurts to reach back and grab seatbelt.     Objective   See Exercise, Manual, and Modality Logs for complete treatment.     Assessment/Plan  Pain w/ L shoulder PROM flexion at end range and abduction >90deg. Some improvement w/ PA and caudal glides. Progressed HEP to include resisted bilateral ER and doorway stretch. Pt reported neck pain relief with traction. Progress per POC.      Timed:         Manual Therapy:    15     mins  22145;     Therapeutic Exercise:    10     mins  80307;     Neuromuscular Suki:    0    mins  22070;    Therapeutic Activity:     0     mins  05803;     Gait Trainin     mins  37020;     Ultrasound:     0     mins  41722;    Ionto                               0    mins   94310  Traction 5 mins        Timed Treatment:   30   mins   Total Treatment:     30   mins    Carmen Baker PT  [place] License: [id number]

## 2021-12-15 ENCOUNTER — HOSPITAL ENCOUNTER (OUTPATIENT)
Dept: MAMMOGRAPHY | Facility: HOSPITAL | Age: 66
Discharge: HOME | End: 2021-12-15
Admitting: FAMILY MEDICINE

## 2021-12-15 DIAGNOSIS — Z12.31 ENCOUNTER FOR SCREENING MAMMOGRAM FOR MALIGNANT NEOPLASM OF BREAST: ICD-10-CM

## 2021-12-15 PROCEDURE — 77067 SCR MAMMO BI INCL CAD: CPT

## 2021-12-15 PROCEDURE — 77063 BREAST TOMOSYNTHESIS BI: CPT

## 2021-12-20 ENCOUNTER — TREATMENT (OUTPATIENT)
Dept: PHYSICAL THERAPY | Facility: CLINIC | Age: 66
End: 2021-12-20

## 2021-12-20 DIAGNOSIS — R68.89 IMPAIRED FUNCTION OF UPPER EXTREMITY: ICD-10-CM

## 2021-12-20 DIAGNOSIS — M79.602 PAIN OF LEFT UPPER EXTREMITY: ICD-10-CM

## 2021-12-20 DIAGNOSIS — M54.2 PAIN, NECK: Primary | ICD-10-CM

## 2021-12-20 PROCEDURE — 97110 THERAPEUTIC EXERCISES: CPT | Performed by: PHYSICAL THERAPIST

## 2021-12-20 PROCEDURE — 97012 MECHANICAL TRACTION THERAPY: CPT | Performed by: PHYSICAL THERAPIST

## 2021-12-20 PROCEDURE — 20560 NDL INSJ W/O NJX 1 OR 2 MUSC: CPT | Performed by: PHYSICAL THERAPIST

## 2021-12-20 NOTE — PROGRESS NOTES
Physical Therapy Daily Treatment Note      Patient: Marychuy De Paz   : 1955  Referring practitioner: Deejay Ford MD  Date of Initial Visit: Type: THERAPY  Noted: 12/3/2021  Today's Date: 2021  Patient seen for 3 sessions       Visit Diagnoses:    ICD-10-CM ICD-9-CM   1. Pain, neck  M54.2 723.1   2. Impaired function of upper extremity  R68.89 V49.5   3. Pain of left upper extremity  M79.602 729.5       Subjective   Feeling pretty good. Not hurting nearly as much as I had been. No pain this AM.    Objective   See Exercise, Manual, and Modality Logs for complete treatment.       Assessment/Plan  Increased pain in L upper trapezius/levator scapulae with theraband bilateral external rotation. Pain relieved with dry needling and cervical traction at end of session. Progress per POC.      Timed:         Manual Therapy:    0     mins  62774;     Therapeutic Exercise:    25     mins  36460;     Neuromuscular Suki:    0    mins  82191;    Therapeutic Activity:     0     mins  69032;     Gait Trainin     mins  91517;     Ultrasound:     0     mins  89091;    Ionto                               0    mins   56783  Dry needling 10 mins  Traction 10 mins        Timed Treatment:   25   mins   Total Treatment:     45   mins    Carmen Baker, PT  [place] License: [id number]

## 2022-01-12 ENCOUNTER — TRANSCRIBE ORDERS (OUTPATIENT)
Dept: ADMINISTRATIVE | Facility: HOSPITAL | Age: 67
End: 2022-01-12

## 2022-01-12 DIAGNOSIS — M50.30 DDD (DEGENERATIVE DISC DISEASE), CERVICAL: Primary | ICD-10-CM

## 2022-01-18 ENCOUNTER — HOSPITAL ENCOUNTER (OUTPATIENT)
Dept: MRI IMAGING | Facility: HOSPITAL | Age: 67
Discharge: HOME | End: 2022-01-18
Admitting: NEUROLOGICAL SURGERY

## 2022-01-18 DIAGNOSIS — M50.30 DDD (DEGENERATIVE DISC DISEASE), CERVICAL: ICD-10-CM

## 2022-01-18 PROCEDURE — 72141 MRI NECK SPINE W/O DYE: CPT

## 2022-01-19 ENCOUNTER — HOSPITAL ENCOUNTER (OUTPATIENT)
Dept: MRI IMAGING | Facility: HOSPITAL | Age: 67
End: 2022-01-19

## 2022-01-31 RX ORDER — AMLODIPINE BESYLATE 5 MG/1
TABLET ORAL
Qty: 90 TABLET | Refills: 0 | Status: SHIPPED | OUTPATIENT
Start: 2022-01-31 | End: 2022-05-02

## 2022-02-10 NOTE — TELEPHONE ENCOUNTER
Rx Refill Note  Requested Prescriptions     Pending Prescriptions Disp Refills   • valACYclovir (VALTREX) 500 MG tablet [Pharmacy Med Name: VALACYCLOVIR 500MG TABLETS] 8 tablet 1     Sig: TAKE 2 TABLETS BY MOUTH TWICE DAILY AT ONSET OF FEVER BLISTERS FOR 1 DAY      Last office visit with prescribing clinician: 4/9/2021      Next office visit with prescribing clinician: 4/11/2022            Meghann Jenkins LPN  02/10/22, 13:59 EST

## 2022-02-11 RX ORDER — VALACYCLOVIR HYDROCHLORIDE 500 MG/1
TABLET, FILM COATED ORAL
Qty: 8 TABLET | Refills: 1 | Status: SHIPPED | OUTPATIENT
Start: 2022-02-11 | End: 2022-08-22

## 2022-02-24 ENCOUNTER — TREATMENT (OUTPATIENT)
Dept: PHYSICAL THERAPY | Facility: CLINIC | Age: 67
End: 2022-02-24

## 2022-02-24 DIAGNOSIS — M54.41 CHRONIC BILATERAL LOW BACK PAIN WITH RIGHT-SIDED SCIATICA: Primary | ICD-10-CM

## 2022-02-24 DIAGNOSIS — Z74.09 IMPAIRED FUNCTIONAL MOBILITY, BALANCE, GAIT, AND ENDURANCE: ICD-10-CM

## 2022-02-24 DIAGNOSIS — G89.29 CHRONIC BILATERAL LOW BACK PAIN WITH RIGHT-SIDED SCIATICA: Primary | ICD-10-CM

## 2022-02-24 PROCEDURE — 97161 PT EVAL LOW COMPLEX 20 MIN: CPT | Performed by: PHYSICAL THERAPIST

## 2022-02-24 PROCEDURE — 20560 NDL INSJ W/O NJX 1 OR 2 MUSC: CPT | Performed by: PHYSICAL THERAPIST

## 2022-02-24 NOTE — PROGRESS NOTES
Physical Therapy Initial Evaluation and Plan of Care      Patient: Marychuy De Paz   : 1955  Diagnosis/ICD-10 Code:  Chronic bilateral low back pain with right-sided sciatica [M54.41, G89.29]  Referring practitioner: No ref. provider found  Date of Initial Visit: 2022  Today's Date: 2022  Patient seen for 1 sessions           Subjective Questionnaire: Oswestry: 36/100      Subjective Evaluation    History of Present Illness  Mechanism of injury: Several year history of low back pain. Last participated in PT/dry needling for low back pain in . Was moving a large patient 2 days ago. No pain during. After driving 1 hr home, I could barely walk into my home. Pain lifting R leg to go up stairs and get in/out of car. I have tried previous PT exercises w/o relief.  Describes pain in R>L low back/buttocks  Standing more comfortable than sitting  Pain bending  Relief with TENS and 800mg Motrin  No change with salonpas patch  Relief with topical capsaicin    Hx of fall on tailbone several years ago. States both SIJs are unstable.   Lumbar MRI 2017 mod-severe DDD.      Patient Occupation: Radiology tech Pain  Current pain ratin  At worst pain ratin  Location: R low back pain  Quality: sharp  Relieving factors: change in position  Progression: no change    Social Support  Lives in: multiple-level home  Lives with: spouse    Diagnostic Tests  MRI studies: abnormal    Treatments  Previous treatment: physical therapy and medication  Patient Goals  Patient goals for therapy: decreased pain, increased motion and independence with ADLs/IADLs             Objective        Special Questions  Patient is experiencing disturbed sleep.     Additional Special Questions  Denies change in bladder, bowel      Palpation     Additional Palpation Details  TTP R glutes, piriformis    Tenderness     Lumbar Spine  Tenderness in the facet joint (R L5/S1).     Left Hip   Tenderness in the ASIS.     Right Hip   Tenderness  in the ASIS.     Active Range of Motion     Lumbar   Flexion: WFL and with pain  Extension: WFL  Left lateral flexion: WFL and with pain  Right lateral flexion: WFL and with pain  Left rotation: WFL and with pain  Right rotation: WFL    Additional Active Range of Motion Details  Pain flexion>extension, sidebending R>L    Strength/Myotome Testing     Left Hip   Planes of Motion   Flexion: 5    Right Hip   Planes of Motion   Flexion: 4 (PAIN)    Left Knee   Flexion: 5  Extension: 5    Right Knee   Flexion: 5  Extension: 5    Left Ankle/Foot   Dorsiflexion: 5    Right Ankle/Foot   Dorsiflexion: 5    Tests       Thoracic   Positive slump (R).     Lumbar     Left   Negative passive SLR.     Right   Negative passive SLR.     Additional Tests Details  MARTHA: L pain L posterior hip (R negative)     General Comments     Lumbar Comments  R LE short  B ASIS TTP  R ASIS/ischial tub elevated         Assessment & Plan     Assessment  Impairments: abnormal muscle tone, abnormal or restricted ROM, activity intolerance, impaired physical strength, lacks appropriate home exercise program and pain with function  Functional Limitations: carrying objects, lifting, sleeping, walking, pulling, uncomfortable because of pain, moving in bed, sitting, standing and stooping  Assessment details: Pt presents w/ acute on chronic R low back pain. Objective findings include painful lumbar AROM, tenderness, elevated R innominate, positive Slump, positive MARTHA, and pain w/ resisted hip flexion. Pt will benefit from skilled PT services in order to address listed impairments and increase tolerance to normal daily activities including ADLs, work, and recreational activities.    Prognosis: good    Goals  Plan Goals: Short Term Goals: 4-6 weeks. Patient will:  1. Be independent with initial HEP  2. Demonstrate lumbar AROM WNL w/ min to no pain  3. Demonstrate TrA contraction during exercises in clinic without need for cueing.  4. Report pain of </= 5/10  with daily activities    Long Term Goals: 6-12 weeks. Patient will:  1. Demonstrate improved Bilateral lower extremity/lower abdominal MMT of >/= 5/5 to allow for performance of daily activities without pain.  2. Demonstrate lower extremity flexibility and lumbar ROM WFL to allow for return to household & recreational activities w/o increased symptoms  3. Report pain of </= 1/10 with all daily activities.  4. Perceived disability </= 10% as measured by Oswestry Questionnaire.  5. Be independent with long term HEP    Plan  Therapy options: will be seen for skilled therapy services  Planned modality interventions: cryotherapy, electrical stimulation/Russian stimulation, thermotherapy (hydrocollator packs), traction, ultrasound, dry needling and iontophoresis  Planned therapy interventions: abdominal trunk stabilization, ADL retraining, body mechanics training, balance/weight-bearing training, flexibility, functional ROM exercises, gait training, home exercise program, joint mobilization, manual therapy, neuromuscular re-education, soft tissue mobilization, spinal/joint mobilization, strengthening, stretching and therapeutic activities  Frequency: 2x week  Duration in weeks: 12  Treatment plan discussed with: patient        Manual Therapy:    5     mins  10810;  Therapeutic Exercise:    5     mins  04941;     Neuromuscular Suki:    0    mins  65096;    Therapeutic Activity:     0     mins  21480;     Gait Trainin     mins  94147;     Ultrasound:     0     mins  30331;    Electrical Stimulation:    0     mins  21433 ( );  Dry Needling     15     mins self-pay    Timed Treatment:   10   mins   Total Treatment:     50   mins    PT SIGNATURE: Carmen Baker PT   DATE TREATMENT INITIATED: 2022    Initial Certification  Certification Period: 2022  I certify that the therapy services are furnished while this patient is under my care.  The services outlined above are required by this patient, and will  be reviewed every 90 days.     PHYSICIAN:       DATE:     Please sign and return via fax to [phone].. Thank you, [facility].

## 2022-03-02 ENCOUNTER — TREATMENT (OUTPATIENT)
Dept: PHYSICAL THERAPY | Facility: CLINIC | Age: 67
End: 2022-03-02

## 2022-03-02 DIAGNOSIS — M54.41 CHRONIC BILATERAL LOW BACK PAIN WITH RIGHT-SIDED SCIATICA: Primary | ICD-10-CM

## 2022-03-02 DIAGNOSIS — G89.29 CHRONIC BILATERAL LOW BACK PAIN WITH RIGHT-SIDED SCIATICA: Primary | ICD-10-CM

## 2022-03-02 DIAGNOSIS — Z74.09 IMPAIRED FUNCTIONAL MOBILITY, BALANCE, GAIT, AND ENDURANCE: ICD-10-CM

## 2022-03-02 PROCEDURE — 97110 THERAPEUTIC EXERCISES: CPT | Performed by: PHYSICAL THERAPIST

## 2022-03-02 PROCEDURE — 97012 MECHANICAL TRACTION THERAPY: CPT | Performed by: PHYSICAL THERAPIST

## 2022-03-02 NOTE — PROGRESS NOTES
Physical Therapy Daily Treatment Note      Patient: Marychuy De Paz   : 1955  Referring practitioner: Deejay Ford MD  Date of Initial Visit: Type: THERAPY  Noted: 2022  Today's Date: 3/2/2022  Patient seen for 2 sessions       Visit Diagnoses:    ICD-10-CM ICD-9-CM   1. Chronic bilateral low back pain with right-sided sciatica  M54.41 724.2    G89.29 724.3     338.29   2. Impaired functional mobility, balance, gait, and endurance  Z74.09 V49.89       Subjective   Pain not going to my hips, but my back is horrible. Pain after working 12 hour shift. My back doesn't want to straighten; I'm bent over. Pain is bilateral.    Objective   R leg short, elevated innominate  See Exercise, Manual, and Modality Logs for complete treatment.       Assessment/Plan  Leg length discrepancy corrected with long axis traction. Progressed core stability and updated HEP. Pt reported relief with mechanical traction. Progress per POC.    Timed:         Manual Therapy:    3     mins  91738;     Therapeutic Exercise:    25     mins  19763;     Neuromuscular Suki:    0    mins  34752;    Therapeutic Activity:     0     mins  83622;     Gait Trainin     mins  04931;     Ultrasound:     0     mins  87342;    Ionto                               0    mins   42821  Traction 10 mins      Timed Treatment:   28   mins   Total Treatment:     40   mins    Carmen Baker PT  [place] License: [id number]

## 2022-03-03 ENCOUNTER — TRANSCRIBE ORDERS (OUTPATIENT)
Dept: PHYSICAL THERAPY | Facility: CLINIC | Age: 67
End: 2022-03-03

## 2022-03-03 DIAGNOSIS — M75.112 NONTRAUMATIC INCOMPLETE TEAR OF LEFT ROTATOR CUFF: Primary | ICD-10-CM

## 2022-03-07 ENCOUNTER — TREATMENT (OUTPATIENT)
Dept: PHYSICAL THERAPY | Facility: CLINIC | Age: 67
End: 2022-03-07

## 2022-03-07 DIAGNOSIS — Z74.09 IMPAIRED FUNCTIONAL MOBILITY, BALANCE, GAIT, AND ENDURANCE: ICD-10-CM

## 2022-03-07 DIAGNOSIS — G89.29 CHRONIC BILATERAL LOW BACK PAIN WITH RIGHT-SIDED SCIATICA: Primary | ICD-10-CM

## 2022-03-07 DIAGNOSIS — M54.41 CHRONIC BILATERAL LOW BACK PAIN WITH RIGHT-SIDED SCIATICA: Primary | ICD-10-CM

## 2022-03-07 PROCEDURE — 97110 THERAPEUTIC EXERCISES: CPT | Performed by: PHYSICAL THERAPIST

## 2022-03-07 PROCEDURE — 20560 NDL INSJ W/O NJX 1 OR 2 MUSC: CPT | Performed by: PHYSICAL THERAPIST

## 2022-03-07 NOTE — PROGRESS NOTES
Physical Therapy Daily Treatment Note      Patient: Marychuy De Paz   : 1955  Referring practitioner: URSULA Konx  Date of Initial Visit: Type: THERAPY  Noted: 2022  Today's Date: 3/7/2022  Patient seen for 3 sessions       Visit Diagnoses:    ICD-10-CM ICD-9-CM   1. Chronic bilateral low back pain with right-sided sciatica  M54.41 724.2    G89.29 724.3     338.29   2. Impaired functional mobility, balance, gait, and endurance  Z74.09 V49.89       Subjective   Better. Pain in R SIJ only. No pain in leg. Rates pain 3-10. Has not done home program since last visit. I can't sleep on my back due to sinus drainage. I often need to use the bathroom after my exercises and have noticed less bladder control.    Objective   R LE short, elevated innominate  TTP R SIJ, R glutes    See Exercise, Manual, and Modality Logs for complete treatment.       Assessment/Plan  Presented with elevated R innominate, corrected with long axis traction. Pt had difficulty isolating diaphragm//limiting use of accessory breathing musclses and reported L SIJ pain with breathing exercise. Decreased pain overall after manual interventions, exercise, and dry needling.    Recommend patient pursue appt with Yordy due to reports of bladder change.    Timed:         Manual Therapy:    5     mins  40938;     Therapeutic Exercise:    20     mins  80313;     Neuromuscular Suki:    0    mins  92965;    Therapeutic Activity:     0     mins  76332;     Gait Trainin     mins  54334;     Ultrasound:     0     mins  04564;    Ionto                               0    mins   19390  Dry needling 10 mins        Timed Treatment:   25   mins   Total Treatment:     40   mins    Carmen Baker PT  [place] License: [id number]

## 2022-03-09 ENCOUNTER — TREATMENT (OUTPATIENT)
Dept: PHYSICAL THERAPY | Facility: CLINIC | Age: 67
End: 2022-03-09

## 2022-03-09 DIAGNOSIS — M54.41 CHRONIC BILATERAL LOW BACK PAIN WITH RIGHT-SIDED SCIATICA: Primary | ICD-10-CM

## 2022-03-09 DIAGNOSIS — Z74.09 IMPAIRED FUNCTIONAL MOBILITY, BALANCE, GAIT, AND ENDURANCE: ICD-10-CM

## 2022-03-09 DIAGNOSIS — G89.29 CHRONIC BILATERAL LOW BACK PAIN WITH RIGHT-SIDED SCIATICA: Primary | ICD-10-CM

## 2022-03-09 PROCEDURE — 97012 MECHANICAL TRACTION THERAPY: CPT | Performed by: PHYSICAL THERAPIST

## 2022-03-09 NOTE — PROGRESS NOTES
"Physical Therapy Daily Treatment Note      Patient: Marychuy De Paz   : 1955  Referring practitioner: URSULA Knox  Date of Initial Visit: Type: THERAPY  Noted: 2022  Today's Date: 3/9/2022  Patient seen for 4 sessions       Visit Diagnoses:    ICD-10-CM ICD-9-CM   1. Chronic bilateral low back pain with right-sided sciatica  M54.41 724.2    G89.29 724.3     338.29   2. Impaired functional mobility, balance, gait, and endurance  Z74.09 V49.89       Subjective   Increased pain the past 2 days. New shooting pain in L groin. Describes pain \"in both my SI joints\". Feels better today after exercises, inversion table, and 800mg ibuprofen. Slept on ice.    Objective   See Exercise, Manual, and Modality Logs for complete treatment.     Assessment/Plan  Session limited to lumbar traction only as patient was 30 mins late for appt. Pt reported decreased low back pain after traction.    Timed:         Manual Therapy:    0     mins  42997;     Therapeutic Exercise:    0     mins  61085;     Neuromuscular Suki:    0    mins  72032;    Therapeutic Activity:     0     mins  66550;     Gait Trainin     mins  35641;     Ultrasound:     0     mins  57569;    Ionto                               0    mins   85183        Timed Treatment:   0   mins   Total Treatment:     20   mins    Carmen Baker PT  [place] License: [id number]  "

## 2022-03-16 ENCOUNTER — TREATMENT (OUTPATIENT)
Dept: PHYSICAL THERAPY | Facility: CLINIC | Age: 67
End: 2022-03-16

## 2022-03-16 DIAGNOSIS — G89.29 CHRONIC BILATERAL LOW BACK PAIN WITH RIGHT-SIDED SCIATICA: Primary | ICD-10-CM

## 2022-03-16 DIAGNOSIS — M54.41 CHRONIC BILATERAL LOW BACK PAIN WITH RIGHT-SIDED SCIATICA: Primary | ICD-10-CM

## 2022-03-16 DIAGNOSIS — Z74.09 IMPAIRED FUNCTIONAL MOBILITY, BALANCE, GAIT, AND ENDURANCE: ICD-10-CM

## 2022-03-16 PROCEDURE — 97110 THERAPEUTIC EXERCISES: CPT | Performed by: PHYSICAL THERAPIST

## 2022-03-16 PROCEDURE — 97012 MECHANICAL TRACTION THERAPY: CPT | Performed by: PHYSICAL THERAPIST

## 2022-03-16 NOTE — PROGRESS NOTES
Physical Therapy Daily Treatment Note      Patient: Marychuy De Paz   : 1955  Referring practitioner: Deejay Ford MD  Date of Initial Visit: Type: THERAPY  Noted: 2022  Today's Date: 3/16/2022  Patient seen for 5 sessions       Visit Diagnoses:    ICD-10-CM ICD-9-CM   1. Chronic bilateral low back pain with right-sided sciatica  M54.41 724.2    G89.29 724.3     338.29   2. Impaired functional mobility, balance, gait, and endurance  Z74.09 V49.89       Subjective   [place] great at Dr raygoza yesterday. 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 Per pt report, she had Xray, was told to continue PT, and she may benefit from nerve ablation. After today, she'd like to transition to a home program.    Objective   See Exercise, Manual, and Modality Logs for complete treatment.     Assessment/Plan  Presented w/o leg length discrepancy. No c/o pain with core progressions. Reports pain relief with mechanical traction. Progress per POC.      Timed:         Manual Therapy:    0     mins  24978;     Therapeutic Exercise:    30     mins  60215;     Neuromuscular Suki:    0    mins  31052;    Therapeutic Activity:     0     mins  69828;     Gait Trainin     mins  27532;     Ultrasound:     0     mins  89688;    Ionto                               0    mins   99832  Traction 15 mins        Timed Treatment:   30   mins   Total Treatment:     45   mins    Carmen Baker, PT  [place] License: [id number]

## 2022-03-18 ENCOUNTER — TELEPHONE (OUTPATIENT)
Dept: FAMILY MEDICINE CLINIC | Facility: CLINIC | Age: 67
End: 2022-03-18

## 2022-04-11 ENCOUNTER — OFFICE VISIT (OUTPATIENT)
Dept: FAMILY MEDICINE CLINIC | Facility: CLINIC | Age: 67
End: 2022-04-11

## 2022-04-11 VITALS
DIASTOLIC BLOOD PRESSURE: 72 MMHG | HEIGHT: 65 IN | WEIGHT: 186.5 LBS | OXYGEN SATURATION: 97 % | RESPIRATION RATE: 19 BRPM | HEART RATE: 99 BPM | SYSTOLIC BLOOD PRESSURE: 104 MMHG | BODY MASS INDEX: 31.07 KG/M2 | TEMPERATURE: 98.6 F

## 2022-04-11 DIAGNOSIS — E53.8 B12 DEFICIENCY: ICD-10-CM

## 2022-04-11 DIAGNOSIS — E78.2 MIXED HYPERLIPIDEMIA: ICD-10-CM

## 2022-04-11 DIAGNOSIS — E03.9 ACQUIRED HYPOTHYROIDISM: ICD-10-CM

## 2022-04-11 DIAGNOSIS — Z00.00 ANNUAL PHYSICAL EXAM: Primary | ICD-10-CM

## 2022-04-11 DIAGNOSIS — I10 ESSENTIAL HYPERTENSION: ICD-10-CM

## 2022-04-11 DIAGNOSIS — Z12.11 COLON CANCER SCREENING: ICD-10-CM

## 2022-04-11 PROCEDURE — 99396 PREV VISIT EST AGE 40-64: CPT | Performed by: FAMILY MEDICINE

## 2022-04-11 RX ORDER — CETIRIZINE HCL/PSEUDOEPHEDRINE 5 MG-120MG
1 TABLET, EXTENDED RELEASE 12 HR ORAL DAILY
COMMUNITY
Start: 2022-03-23

## 2022-04-11 NOTE — PROGRESS NOTES
"Chief Complaint  Annual Exam    Subjective          Marychuy De Paz presents to [facility]  History of Present Illness  liam does the dry needling downstairs with PT and is very good.     Annual exam  She has a horrible time sleeping.   L tryptophan. Order from [employer]. Ellen estefania is specialist. Said just started it and slept  Able to go back to sleep after the restroom. She got intervals of sleep after going to the bathroom.   Tried meds for urgency.   Said her bladder never felt empty.   Has been going on for years.     Due for colonoscopy    Objective   Vital Signs:   /72 (BP Location: Right arm, Patient Position: Sitting, Cuff Size: Adult)   Pulse 99   Temp 98.6 °F (37 °C) (Temporal)   Resp 19   Ht 165.1 cm (65\")   Wt 84.6 kg (186 lb 8 oz)   SpO2 97%   BMI 31.04 kg/m²            Physical Exam  Vitals reviewed.   Constitutional:       General: She is not in acute distress.     Appearance: Normal appearance. She is not ill-appearing or toxic-appearing.   HENT:      Head: Normocephalic and atraumatic.      Right Ear: Tympanic membrane, ear canal and external ear normal. There is no impacted cerumen.      Left Ear: Tympanic membrane, ear canal and external ear normal. There is no impacted cerumen.      Nose: Nose normal.      Mouth/Throat:      Mouth: Mucous membranes are moist.      Pharynx: Oropharynx is clear. No oropharyngeal exudate or posterior oropharyngeal erythema.   Eyes:      General: No scleral icterus.        Right eye: No discharge.         Left eye: No discharge.      Conjunctiva/sclera: Conjunctivae normal.   Neck:      Thyroid: No thyromegaly.      Vascular: No carotid bruit.   Cardiovascular:      Rate and Rhythm: Normal rate and regular rhythm.      Heart sounds: Normal heart sounds. No murmur heard.    No friction rub. No gallop.   Pulmonary:      Effort: Pulmonary effort is normal. No respiratory distress.      Breath sounds: " Normal breath sounds. No wheezing or rales.   Chest:      Chest wall: No tenderness.   Abdominal:      General: Bowel sounds are normal. There is no distension.      Palpations: Abdomen is soft. There is no mass.      Tenderness: There is no abdominal tenderness. There is no guarding.   Musculoskeletal:         General: No deformity.      Cervical back: Neck supple.      Right lower leg: No edema.      Left lower leg: No edema.   Lymphadenopathy:      Cervical: No cervical adenopathy.   Skin:     Coloration: Skin is not jaundiced or pale.   Neurological:      General: No focal deficit present.      Mental Status: She is alert and oriented to person, place, and time.      Motor: No abnormal muscle tone.      Coordination: Coordination normal.   Psychiatric:         Mood and Affect: Mood normal.         Behavior: Behavior normal.        Result Review :                 Assessment and Plan    Diagnoses and all orders for this visit:    1. Annual physical exam (Primary)    2. Colon cancer screening  -     Cancel: Cologuard - Stool, Per Rectum; Future  -     Ambulatory Referral For Screening Colonoscopy    3. Essential hypertension  -     CBC & Differential  -     Comprehensive Metabolic Panel    4. Acquired hypothyroidism  -     TSH  -     T4  -     T4, Free    5. B12 deficiency  -     Vitamin B12    6. Mixed hyperlipidemia  -     Lipid Panel        Follow Up   No follow-ups on file.  Patient was given instructions and counseling regarding her condition or for health maintenance advice. Please see specific information pulled into the AVS if appropriate.     Hx of extensive pelvic and abdominal surgeries related to enlarged and hardened cyst from uterus injuring bladder. She had as a result of that 3 surgeries and a severe infection. She also had a ventral incarcerated hernia.   She has persistent urinary frequency and urgency, through the night, effects her sleep and that is the biggest complaint.   She is trying a new  supplement and seems to be helping for her to return to sleep after waking. Just taken a few nights. Promising so far.     Preventive counseling.   We discussed the importance of regular physical activity.  Cardiovascular activity for the equivalent of 30 minutes 5 days per week.  We also discussed the importance of healthy diet to avoid weight gain and sugar intolerance.  I recommend predominantly filling the diet with vegetables and lean meats followed by fruits and whole grains.  I also recommend overall avoiding processed foods.  She is on unopposed estrogen, and estrogen is just topical, as of late but I think ok given she had a hysterectomy.   She is due for colonoscopy and has decided she will go ahead with this and prefers to have with Dr. Dudley.   She will not be getting COVID booster. She has tested positive for COVID twice and given symptoms and exposure she has likely had it three times.   I think this is reasonable given her history.   She is due for lab work, will get them downstairs in the next week or so.

## 2022-04-12 ENCOUNTER — LAB (OUTPATIENT)
Dept: LAB | Facility: HOSPITAL | Age: 67
End: 2022-04-12

## 2022-04-12 LAB
ALBUMIN SERPL-MCNC: 4.5 G/DL (ref 3.5–5.2)
ALBUMIN/GLOB SERPL: 2 G/DL
ALP SERPL-CCNC: 72 U/L (ref 39–117)
ALT SERPL W P-5'-P-CCNC: 19 U/L (ref 1–33)
ANION GAP SERPL CALCULATED.3IONS-SCNC: 10.1 MMOL/L (ref 5–15)
AST SERPL-CCNC: 17 U/L (ref 1–32)
BASOPHILS # BLD AUTO: 0.03 10*3/MM3 (ref 0–0.2)
BASOPHILS NFR BLD AUTO: 0.5 % (ref 0–1.5)
BILIRUB SERPL-MCNC: 0.3 MG/DL (ref 0–1.2)
BUN SERPL-MCNC: 16 MG/DL (ref 8–23)
BUN/CREAT SERPL: 20.5 (ref 7–25)
CALCIUM SPEC-SCNC: 9.5 MG/DL (ref 8.6–10.5)
CHLORIDE SERPL-SCNC: 107 MMOL/L (ref 98–107)
CHOLEST SERPL-MCNC: 187 MG/DL (ref 0–200)
CO2 SERPL-SCNC: 24.9 MMOL/L (ref 22–29)
CREAT SERPL-MCNC: 0.78 MG/DL (ref 0.57–1)
DEPRECATED RDW RBC AUTO: 45.1 FL (ref 37–54)
EGFRCR SERPLBLD CKD-EPI 2021: 84.9 ML/MIN/1.73
EOSINOPHIL # BLD AUTO: 0.03 10*3/MM3 (ref 0–0.4)
EOSINOPHIL NFR BLD AUTO: 0.5 % (ref 0.3–6.2)
ERYTHROCYTE [DISTWIDTH] IN BLOOD BY AUTOMATED COUNT: 12.9 % (ref 12.3–15.4)
GLOBULIN UR ELPH-MCNC: 2.3 GM/DL
GLUCOSE SERPL-MCNC: 107 MG/DL (ref 65–99)
HCT VFR BLD AUTO: 40.4 % (ref 34–46.6)
HDLC SERPL-MCNC: 57 MG/DL (ref 40–60)
HGB BLD-MCNC: 13.1 G/DL (ref 12–15.9)
IMM GRANULOCYTES # BLD AUTO: 0.02 10*3/MM3 (ref 0–0.05)
IMM GRANULOCYTES NFR BLD AUTO: 0.3 % (ref 0–0.5)
LDLC SERPL CALC-MCNC: 113 MG/DL (ref 0–100)
LDLC/HDLC SERPL: 1.95 {RATIO}
LYMPHOCYTES # BLD AUTO: 1.61 10*3/MM3 (ref 0.7–3.1)
LYMPHOCYTES NFR BLD AUTO: 25.9 % (ref 19.6–45.3)
MCH RBC QN AUTO: 30.7 PG (ref 26.6–33)
MCHC RBC AUTO-ENTMCNC: 32.4 G/DL (ref 31.5–35.7)
MCV RBC AUTO: 94.6 FL (ref 79–97)
MONOCYTES # BLD AUTO: 0.58 10*3/MM3 (ref 0.1–0.9)
MONOCYTES NFR BLD AUTO: 9.3 % (ref 5–12)
NEUTROPHILS NFR BLD AUTO: 3.95 10*3/MM3 (ref 1.7–7)
NEUTROPHILS NFR BLD AUTO: 63.5 % (ref 42.7–76)
NRBC BLD AUTO-RTO: 0 /100 WBC (ref 0–0.2)
PLATELET # BLD AUTO: 272 10*3/MM3 (ref 140–450)
PMV BLD AUTO: 9.1 FL (ref 6–12)
POTASSIUM SERPL-SCNC: 4.6 MMOL/L (ref 3.5–5.2)
PROT SERPL-MCNC: 6.8 G/DL (ref 6–8.5)
RBC # BLD AUTO: 4.27 10*6/MM3 (ref 3.77–5.28)
SODIUM SERPL-SCNC: 142 MMOL/L (ref 136–145)
T4 FREE SERPL-MCNC: 1.24 NG/DL (ref 0.93–1.7)
T4 SERPL-MCNC: 7.6 MCG/DL (ref 4.5–11.7)
TRIGL SERPL-MCNC: 95 MG/DL (ref 0–150)
TSH SERPL DL<=0.05 MIU/L-ACNC: 1.46 UIU/ML (ref 0.27–4.2)
VIT B12 BLD-MCNC: 293 PG/ML (ref 211–946)
VLDLC SERPL-MCNC: 17 MG/DL (ref 5–40)
WBC NRBC COR # BLD: 6.22 10*3/MM3 (ref 3.4–10.8)

## 2022-04-12 PROCEDURE — 80050 GENERAL HEALTH PANEL: CPT | Performed by: FAMILY MEDICINE

## 2022-04-12 PROCEDURE — 82607 VITAMIN B-12: CPT | Performed by: FAMILY MEDICINE

## 2022-04-12 PROCEDURE — 36415 COLL VENOUS BLD VENIPUNCTURE: CPT | Performed by: FAMILY MEDICINE

## 2022-04-12 PROCEDURE — 80061 LIPID PANEL: CPT | Performed by: FAMILY MEDICINE

## 2022-04-12 PROCEDURE — 84439 ASSAY OF FREE THYROXINE: CPT | Performed by: FAMILY MEDICINE

## 2022-04-27 ENCOUNTER — PREP FOR SURGERY (OUTPATIENT)
Dept: OTHER | Facility: HOSPITAL | Age: 67
End: 2022-04-27

## 2022-04-27 DIAGNOSIS — Z12.11 SCREEN FOR COLON CANCER: Primary | ICD-10-CM

## 2022-05-02 RX ORDER — AMLODIPINE BESYLATE 5 MG/1
TABLET ORAL
Qty: 90 TABLET | Refills: 3 | Status: SHIPPED | OUTPATIENT
Start: 2022-05-02 | End: 2023-01-10 | Stop reason: SDUPTHER

## 2022-05-02 NOTE — TELEPHONE ENCOUNTER
Rx Refill Note  Requested Prescriptions     Pending Prescriptions Disp Refills   • amLODIPine (NORVASC) 5 MG tablet [Pharmacy Med Name: AMLODIPINE BESYLATE TABS 5MG] 90 tablet 3     Sig: TAKE 1 TABLET DAILY      Last office visit with prescribing clinician: 4/11/2022      Next office visit with prescribing clinician: Visit date not found            Meghann Jenkins LPN  05/02/22, 09:44 EDT

## 2022-05-19 ENCOUNTER — CLINICAL SUPPORT (OUTPATIENT)
Dept: FAMILY MEDICINE CLINIC | Facility: CLINIC | Age: 67
End: 2022-05-19

## 2022-05-19 ENCOUNTER — TELEPHONE (OUTPATIENT)
Dept: FAMILY MEDICINE CLINIC | Facility: CLINIC | Age: 67
End: 2022-05-19

## 2022-05-19 DIAGNOSIS — Z23 NEED FOR VACCINATION: Primary | ICD-10-CM

## 2022-05-19 PROCEDURE — 90471 IMMUNIZATION ADMIN: CPT | Performed by: FAMILY MEDICINE

## 2022-05-19 PROCEDURE — 90715 TDAP VACCINE 7 YRS/> IM: CPT | Performed by: FAMILY MEDICINE

## 2022-05-19 NOTE — TELEPHONE ENCOUNTER
Contacted pt, ANUPM that she can come into the office to get her TDAP, per Dr. Licona.    Will put her on MA schedule for this afternoon.    Please advise

## 2022-05-28 RX ORDER — ESTRADIOL 0.1 MG/G
CREAM VAGINAL
Qty: 42.5 G | Refills: 1 | Status: SHIPPED | OUTPATIENT
Start: 2022-05-28 | End: 2022-09-27 | Stop reason: SDUPTHER

## 2022-06-01 ENCOUNTER — TELEPHONE (OUTPATIENT)
Dept: FAMILY MEDICINE CLINIC | Facility: CLINIC | Age: 67
End: 2022-06-01

## 2022-06-01 NOTE — TELEPHONE ENCOUNTER
Office called pt at my request related to her concerning symptoms. Advised if swelling she should be seen at UC and may need doppler given swelling with travel hx.   Office staff reported pt is better today. Back to normal. Took one of her husbands water pills and seemed to help.   Was told if more swelling go to UC or call office back.

## 2022-06-01 NOTE — TELEPHONE ENCOUNTER
----- Message from Meghann Jenkins LPN sent at 5/31/2022 11:09 AM EDT -----  Regarding: FW: Legs and ankles swelling    ----- Message -----  From: Marychuy De Paz  Sent: 5/31/2022  10:58 AM EDT  To: [facility]  Subject: Legs and ankles swelling                         Dr. Licona,  I recently went on a mission trip to [place]. I wore my compression socks because I knew it would be a long ride. We stopped about every 2 hours to stretch our legs and go to the restroom. My feet and ankle became very swollen and stayed that way throughout the week. I have never had this problem before. They are still swollen some, but not to the degree they were. Should I be concerned. I feel so much pressure and the socks are stretched to the limit. Is there anything I can do for this.   Thanks,  Marychuy De Paz

## 2022-06-14 ENCOUNTER — TRANSCRIBE ORDERS (OUTPATIENT)
Dept: ADMINISTRATIVE | Facility: HOSPITAL | Age: 67
End: 2022-06-14

## 2022-06-14 DIAGNOSIS — M54.50 LUMBAR PAIN: Primary | ICD-10-CM

## 2022-06-18 ENCOUNTER — HOSPITAL ENCOUNTER (OUTPATIENT)
Dept: MRI IMAGING | Facility: HOSPITAL | Age: 67
Discharge: HOME | End: 2022-06-18
Admitting: NEUROLOGICAL SURGERY

## 2022-06-18 DIAGNOSIS — M54.50 LUMBAR PAIN: ICD-10-CM

## 2022-06-18 PROCEDURE — 72148 MRI LUMBAR SPINE W/O DYE: CPT

## 2022-07-01 ENCOUNTER — HOSPITAL ENCOUNTER (OUTPATIENT)
Dept: MRI IMAGING | Facility: HOSPITAL | Age: 67
End: 2022-07-01

## 2022-07-27 ENCOUNTER — ANESTHESIA EVENT (OUTPATIENT)
Dept: GASTROENTEROLOGY | Facility: HOSPITAL | Age: 67
End: 2022-07-27

## 2022-07-27 ENCOUNTER — HOSPITAL ENCOUNTER (OUTPATIENT)
Facility: HOSPITAL | Age: 67
Setting detail: HOSPITAL OUTPATIENT SURGERY
Discharge: HOME | End: 2022-07-27
Attending: SURGERY | Admitting: SURGERY

## 2022-07-27 ENCOUNTER — ANESTHESIA (OUTPATIENT)
Dept: GASTROENTEROLOGY | Facility: HOSPITAL | Age: 67
End: 2022-07-27

## 2022-07-27 VITALS
BODY MASS INDEX: 29.27 KG/M2 | SYSTOLIC BLOOD PRESSURE: 129 MMHG | DIASTOLIC BLOOD PRESSURE: 82 MMHG | HEART RATE: 57 BPM | WEIGHT: 175.7 LBS | HEIGHT: 65 IN | RESPIRATION RATE: 20 BRPM | OXYGEN SATURATION: 99 %

## 2022-07-27 DIAGNOSIS — Z12.11 SCREEN FOR COLON CANCER: ICD-10-CM

## 2022-07-27 PROCEDURE — 45380 COLONOSCOPY AND BIOPSY: CPT | Performed by: SURGERY

## 2022-07-27 PROCEDURE — 45385 COLONOSCOPY W/LESION REMOVAL: CPT | Performed by: SURGERY

## 2022-07-27 PROCEDURE — 88305 TISSUE EXAM BY PATHOLOGIST: CPT | Performed by: SURGERY

## 2022-07-27 PROCEDURE — 25010000002 PROPOFOL 10 MG/ML EMULSION: Performed by: ANESTHESIOLOGY

## 2022-07-27 PROCEDURE — S0260 H&P FOR SURGERY: HCPCS | Performed by: SURGERY

## 2022-07-27 DEVICE — DEV CLIP ENDO RESOLUTION360 CONTRL ROT 235CM: Type: IMPLANTABLE DEVICE | Site: CECUM | Status: FUNCTIONAL

## 2022-07-27 RX ORDER — PROPOFOL 10 MG/ML
VIAL (ML) INTRAVENOUS CONTINUOUS PRN
Status: DISCONTINUED | OUTPATIENT
Start: 2022-07-27 | End: 2022-07-27 | Stop reason: SURG

## 2022-07-27 RX ORDER — SODIUM CHLORIDE, SODIUM LACTATE, POTASSIUM CHLORIDE, CALCIUM CHLORIDE 600; 310; 30; 20 MG/100ML; MG/100ML; MG/100ML; MG/100ML
30 INJECTION, SOLUTION INTRAVENOUS CONTINUOUS PRN
Status: DISCONTINUED | OUTPATIENT
Start: 2022-07-27 | End: 2022-07-27 | Stop reason: HOSPADM

## 2022-07-27 RX ORDER — LIDOCAINE HYDROCHLORIDE 20 MG/ML
INJECTION, SOLUTION INFILTRATION; PERINEURAL AS NEEDED
Status: DISCONTINUED | OUTPATIENT
Start: 2022-07-27 | End: 2022-07-27 | Stop reason: SURG

## 2022-07-27 RX ORDER — PROPOFOL 10 MG/ML
VIAL (ML) INTRAVENOUS AS NEEDED
Status: DISCONTINUED | OUTPATIENT
Start: 2022-07-27 | End: 2022-07-27 | Stop reason: SURG

## 2022-07-27 RX ORDER — SODIUM CHLORIDE 0.9 % (FLUSH) 0.9 %
10 SYRINGE (ML) INJECTION EVERY 12 HOURS SCHEDULED
Status: DISCONTINUED | OUTPATIENT
Start: 2022-07-27 | End: 2022-07-27 | Stop reason: HOSPADM

## 2022-07-27 RX ORDER — SODIUM CHLORIDE 0.9 % (FLUSH) 0.9 %
10 SYRINGE (ML) INJECTION AS NEEDED
Status: DISCONTINUED | OUTPATIENT
Start: 2022-07-27 | End: 2022-07-27 | Stop reason: HOSPADM

## 2022-07-27 RX ORDER — MELATONIN
1000 DAILY
COMMUNITY

## 2022-07-27 RX ADMIN — LIDOCAINE HYDROCHLORIDE 60 MG: 20 INJECTION, SOLUTION INFILTRATION; PERINEURAL at 10:47

## 2022-07-27 RX ADMIN — PROPOFOL 100 MG: 10 INJECTION, EMULSION INTRAVENOUS at 10:47

## 2022-07-27 RX ADMIN — SODIUM CHLORIDE, POTASSIUM CHLORIDE, SODIUM LACTATE AND CALCIUM CHLORIDE 30 ML/HR: 600; 310; 30; 20 INJECTION, SOLUTION INTRAVENOUS at 10:08

## 2022-07-27 RX ADMIN — Medication 200 MCG/KG/MIN: at 10:47

## 2022-07-27 NOTE — ANESTHESIA POSTPROCEDURE EVALUATION
"Patient: Marychuy De Paz    Procedure Summary     Date: 07/27/22 Room / [facility]:  [facility]    Anesthesia Start: 1043 Anesthesia Stop: 1112    Procedure: COLONOSCOPY to cecum with cold biopsy/hot snare polypectomies and clip x1 (N/A ) Diagnosis:       Screen for colon cancer      (Screen for colon cancer [Z12.11])    Surgeons: Ajith Dudley MD Provider: Etienne Dumont MD    Anesthesia Type: MAC ASA Status: 3          Anesthesia Type: MAC    Vitals  Vitals Value Taken Time   /82 07/27/22 1131   Temp     Pulse 57 07/27/22 1131   Resp 20 07/27/22 1131   SpO2 99 % 07/27/22 1131           Post Anesthesia Care and Evaluation    Patient location during evaluation: bedside  Patient participation: complete - patient participated  Level of consciousness: awake and alert  Pain management: adequate    Airway patency: patent  Anesthetic complications: No anesthetic complications    Cardiovascular status: acceptable  Respiratory status: acceptable  Hydration status: acceptable    Comments: /82   Pulse 57   Resp 20   Ht 165.1 cm (65\")   Wt 79.7 kg (175 lb 11.2 oz)   LMP  (LMP Unknown)   SpO2 99%   BMI 29.24 kg/m²       "

## 2022-07-27 NOTE — ANESTHESIA PREPROCEDURE EVALUATION
Anesthesia Evaluation     Patient summary reviewed and Nursing notes reviewed   no history of anesthetic complications:  NPO Solid Status: > 8 hours  NPO Liquid Status: > 8 hours           Airway   Mallampati: II  Dental      Pulmonary - negative pulmonary ROS and normal exam   Cardiovascular - normal exam    (+) hypertension 2 medications or greater, hyperlipidemia,       Neuro/Psych- negative ROS  GI/Hepatic/Renal/Endo    (+) obesity,   thyroid problem hypothyroidism    Musculoskeletal     (+) neck pain,   Abdominal    Substance History      OB/GYN          Other   arthritis,                      Anesthesia Plan    ASA 3     MAC     intravenous induction     Anesthetic plan, risks, benefits, and alternatives have been provided, discussed and informed consent has been obtained with: patient.        CODE STATUS:

## 2022-07-28 LAB
LAB AP CASE REPORT: NORMAL
PATH REPORT.FINAL DX SPEC: NORMAL
PATH REPORT.GROSS SPEC: NORMAL

## 2022-07-31 ENCOUNTER — DOCUMENTATION (OUTPATIENT)
Dept: SURGERY | Facility: CLINIC | Age: 67
End: 2022-07-31

## 2022-07-31 NOTE — PROGRESS NOTES
ENDOSCOPY FOLLOW UP NOTE    Colonoscopy 7/27/2022    Indication:  • Screening    Findings/pathology:  • 2 small cecal polyp/tubular adenomas  • 1 cm sessile ascending colon polyp/tubular adenoma  • 2 small sigmoid polyp/tubular adenomas    Recommendations:  • 3-year surveillance    Ajith Dudley M.D.

## 2022-08-01 ENCOUNTER — TELEPHONE (OUTPATIENT)
Dept: SURGERY | Facility: CLINIC | Age: 67
End: 2022-08-01

## 2022-08-01 NOTE — TELEPHONE ENCOUNTER
----- Message from Ajith Dudley MD sent at 7/31/2022  4:51 PM EDT -----  Please let her know that she had 5 benign polyps.  Based on the type and number of polyps, 3-year surveillance recommended-put in computer for reminder

## 2022-08-19 NOTE — TELEPHONE ENCOUNTER
Rx Refill Note  Requested Prescriptions     Pending Prescriptions Disp Refills   • valACYclovir (VALTREX) 500 MG tablet [Pharmacy Med Name: VALACYCLOVIR 500MG TABLETS] 8 tablet 1     Sig: TAKE 2 TABLETS BY MOUTH TWICE DAILY AT ONSET OF COLD SORES FOR 1 DAY      Last office visit with prescribing clinician: 4/11/2022      Next office visit with prescribing clinician: 8/19/2022            Meghann Jenkins LPN  08/19/22, 14:02 EDT

## 2022-08-22 RX ORDER — VALACYCLOVIR HYDROCHLORIDE 500 MG/1
TABLET, FILM COATED ORAL
Qty: 8 TABLET | Refills: 1 | Status: SHIPPED | OUTPATIENT
Start: 2022-08-22

## 2022-09-09 ENCOUNTER — TREATMENT (OUTPATIENT)
Dept: PHYSICAL THERAPY | Facility: CLINIC | Age: 67
End: 2022-09-09

## 2022-09-09 DIAGNOSIS — Z78.9 IMPAIRED MOBILITY AND ACTIVITIES OF DAILY LIVING: ICD-10-CM

## 2022-09-09 DIAGNOSIS — M54.50 CHRONIC BILATERAL LOW BACK PAIN WITHOUT SCIATICA: Primary | ICD-10-CM

## 2022-09-09 DIAGNOSIS — G89.29 CHRONIC BILATERAL LOW BACK PAIN WITHOUT SCIATICA: Primary | ICD-10-CM

## 2022-09-09 DIAGNOSIS — M53.3 SACROILIAC DYSFUNCTION: ICD-10-CM

## 2022-09-09 DIAGNOSIS — Z74.09 IMPAIRED MOBILITY AND ACTIVITIES OF DAILY LIVING: ICD-10-CM

## 2022-09-09 PROCEDURE — 97161 PT EVAL LOW COMPLEX 20 MIN: CPT | Performed by: PHYSICAL THERAPIST

## 2022-09-09 PROCEDURE — 97110 THERAPEUTIC EXERCISES: CPT | Performed by: PHYSICAL THERAPIST

## 2022-09-09 PROCEDURE — 97140 MANUAL THERAPY 1/> REGIONS: CPT | Performed by: PHYSICAL THERAPIST

## 2022-09-09 NOTE — PROGRESS NOTES
Physical Therapy Initial Evaluation and Plan of Care    Patient: Marychuy De Paz   : 1955  Diagnosis/ICD-10 Code:  Chronic bilateral low back pain without sciatica [M54.50, G89.29]  Referring practitioner: Deejay Ford MD  Date of Initial Visit: 2022  Today's Date: 2022  Patient seen for 1 sessions           Subjective Questionnaire: Oswestry: 48/100      Subjective Evaluation    History of Present Illness  Mechanism of injury: Chronic LBP. [facility]/pain management x4 mos. Had bilateral SI injections 22. A week after procedure I could hardly walk. It kicked in 2 weeks later. Per pt, I have lumbar facet arthrtis, L>R. Plan for nerve block/ablation. I'm doing all of my home PT, but I can't get comfortable at night. Feels like I'm being stabbed in L low back. I sleep on sides with pillow between knees and tend to roll on stomach. Wakes up in pain after sleeping on stomach.  Denies leg pain  L foot numb after sitting >30 mins  Back pain increased after four 12 hour drives at the end of May  Taking ibuprofen prn  Relief wearing SI belt (>/= 3 days/week) and inversion table (daily)      Patient Occupation: Radiology technologist Pain  Current pain ratin  At worst pain ratin  Location: L low back, L posterior hip  Quality: sharp  Relieving factors: medications and ice  Aggravating factors: sleeping and movement  Progression: worsening    Diagnostic Tests  MRI studies: abnormal    Treatments  Previous treatment: physical therapy and injection treatment  Current treatment: physical therapy  Patient Goals  Patient goals for therapy: decreased pain, increased motion, increased strength, independence with ADLs/IADLs and return to work             Objective        Special Questions  Patient is experiencing bladder dysfunction.     Additional Special Questions  Urinary urgency, Hx 3 bladder Sx (last in 2019)      Palpation   Left   Tenderness of the gluteus medius and piriformis.      Right Tenderness of the gluteus medius and piriformis.     Tenderness     Lumbar Spine  Tenderness in the spinous process (L5).     Left Hip   Tenderness in the ischial tuberosity and sacroiliac joint.     Right Hip   Tenderness in the ischial tuberosity and sacroiliac joint.     Neurological Testing     Reflexes   Left   Patellar (L4): trace (1+)    Right   Patellar (L4): trace (1+)    Active Range of Motion     Lumbar   Flexion: Active lumbar flexion: L lumbar returning to standing. WFL and with pain  Extension: WFL  Left lateral flexion: WFL and with pain  Right lateral flexion: WFL and with pain  Left rotation: WFL  Right rotation: Active right lumbar rotation: L lumbar. WFL and with pain    Strength/Myotome Testing     Left Hip   Planes of Motion   Flexion: 4+    Right Hip   Planes of Motion   Flexion: 4+    Left Knee   Flexion: 5  Extension: 5    Right Knee   Flexion: 5  Extension: 5    Left Ankle/Foot   Dorsiflexion: 5    Right Ankle/Foot   Dorsiflexion: 5    Tests       Thoracic   Positive slump (L LBP, R posterior thigh pain).     Lumbar     Left   Negative passive SLR.     Right   Negative passive SLR.     Additional Tests Details  L leg long  L ASIS TTP  L ASIS inferior  + MARTHA L for L LBP  L ischial tuberosity superior    Lumbar Flexibility Comments:   Limited hamstrings B          Assessment & Plan     Assessment  Impairments: abnormal or restricted ROM, activity intolerance, lacks appropriate home exercise program and pain with function  Functional Limitations: carrying objects, lifting, sleeping, walking, uncomfortable because of pain, sitting and standing  Assessment details: Pt presents w/ L>R low back pain, pelvic asymetry, painful lumbar AROM, lumbar tenderness, and positive special testing. Will benefit from skilled PT services in order to address listed impairments and increase tolerance to normal daily activities including ADLs, work, and recreational activities.    Prognosis:  good    Goals  Plan Goals: Short Term Goals: 4 weeks. Patient will:  1. Be independent with initial HEP  2. Be instructed in posture and body mechanics  3. Present w/ level pelvis, indicating improved lumbopelvic stability w/ ADLs  4. Report pain of </= 5/10 with daily activities    Long Term Goals: 6-12 weeks. Patient will:  1. Demonstrate lower extremity flexibility and lumbar ROM WFL to allow for return to household & recreational activities w/o increased symptoms  2. Report pain of </= 2/10 with all daily activities.  3. Perceived disability </= 10% as measured by Oswestry Questionnaire.  4. Be independent with long term HEP    Plan  Therapy options: will be seen for skilled therapy services  Planned modality interventions: cryotherapy, electrical stimulation/Russian stimulation, thermotherapy (hydrocollator packs), traction, ultrasound, dry needling and iontophoresis  Planned therapy interventions: abdominal trunk stabilization, ADL retraining, body mechanics training, balance/weight-bearing training, flexibility, functional ROM exercises, gait training, home exercise program, joint mobilization, manual therapy, neuromuscular re-education, soft tissue mobilization, spinal/joint mobilization, strengthening, stretching and therapeutic activities  Frequency: 2x week  Duration in weeks: 12  Treatment plan discussed with: patient        Manual Therapy:    8     mins  32431;  Therapeutic Exercise:    20     mins  79883;     Neuromuscular Suki:    0    mins  40643;    Therapeutic Activity:     0     mins  11046;     Gait Trainin     mins  01139;     Ultrasound:     0     mins  25487;    Electrical Stimulation:    0     mins  07518 ( );  Dry Needling     0     mins self-pay    Timed Treatment:   28   mins   Total Treatment:     50   mins    PT SIGNATURE: Carmen Baker PT   DATE TREATMENT INITIATED: 2022    Initial Certification  Certification Period: 2022  I certify that the therapy services  are furnished while this patient is under my care.  The services outlined above are required by this patient, and will be reviewed every 90 days.     PHYSICIAN: Deejay Ford MD      DATE:     Please sign and return via fax to [phone].. Thank you, [facility].

## 2022-09-09 NOTE — PATIENT INSTRUCTIONS
Access Code: [email or web address]  URL: [email or web address]  Date: 09/09/2022  Prepared by: Carmen Baker    Exercises  Supine Hamstring Stretch - 1 x daily - 7 x weekly - 1 sets - 3 reps - 20s hold  Supine Piriformis Stretch with Foot on Ground - 1 x daily - 7 x weekly - 1 sets - 3 reps - 20s hold  90/90 SI Joint Self-Correction with Dowel - 1 x daily - 7 x weekly - 1 sets - 5 reps - 5s hold  Supine Hip Adduction Isometric with Ball - 1 x daily - 7 x weekly - 1 sets - 10 reps - 5s hold  Hooklying Isometric Clamshell - 1 x daily - 7 x weekly - 1 sets - 10 reps - 5s hold  Supine Posterior Pelvic Tilt - 1 x daily - 7 x weekly - 1 sets - 10 reps - 5s hold  Hooklying Single Leg Bent Knee Fallouts with Resistance - 1 x daily - 7 x weekly - 1 sets - 10 reps  Supine Diaphragmatic Breathing - 1 x daily - 7 x weekly - 1 sets - 1 reps - 2 mins hold

## 2022-09-13 ENCOUNTER — TREATMENT (OUTPATIENT)
Dept: PHYSICAL THERAPY | Facility: CLINIC | Age: 67
End: 2022-09-13

## 2022-09-13 DIAGNOSIS — G89.29 CHRONIC BILATERAL LOW BACK PAIN WITHOUT SCIATICA: Primary | ICD-10-CM

## 2022-09-13 DIAGNOSIS — Z74.09 IMPAIRED MOBILITY AND ACTIVITIES OF DAILY LIVING: ICD-10-CM

## 2022-09-13 DIAGNOSIS — M54.50 CHRONIC BILATERAL LOW BACK PAIN WITHOUT SCIATICA: Primary | ICD-10-CM

## 2022-09-13 DIAGNOSIS — Z78.9 IMPAIRED MOBILITY AND ACTIVITIES OF DAILY LIVING: ICD-10-CM

## 2022-09-13 DIAGNOSIS — M53.3 SACROILIAC DYSFUNCTION: ICD-10-CM

## 2022-09-13 PROCEDURE — 97110 THERAPEUTIC EXERCISES: CPT | Performed by: PHYSICAL THERAPIST

## 2022-09-13 PROCEDURE — 97140 MANUAL THERAPY 1/> REGIONS: CPT | Performed by: PHYSICAL THERAPIST

## 2022-09-13 NOTE — PROGRESS NOTES
Physical Therapy Daily Treatment Note      Patient: Marychuy De Paz   : 1955  Referring practitioner: Deejay Ford MD  Date of Initial Visit: Type: THERAPY  Noted: 2022  Today's Date: 2022  Patient seen for 2 sessions       Visit Diagnoses:    ICD-10-CM ICD-9-CM   1. Chronic bilateral low back pain without sciatica  M54.50 724.2    G89.29 338.29   2. Sacroiliac dysfunction  M53.3 724.6   3. Impaired mobility and activities of daily living  Z74.09 V49.89    Z78.9        Subjective   Doing good today. Pain transferring large patient for Xrays 10 mins ago. Pain at night. Plan for nerve block Monday.    Objective   See Exercise, Manual, and Modality Logs for complete treatment.     Assessment/Plan  Presented w/ L leg long/anterior rotation of L innominate. Some improvement w/ manual interventions, however leg length and pelvic asymmetry remained.. Pt reported less pain laying supine after MET. Continues to have moderate tenderness of L SIJ and posterior hip. Tenderness improved with STM. Progress per POC.    Timed:         Manual Therapy:    15     mins  71000;     Therapeutic Exercise:    15     mins  08905;     Neuromuscular Suki:    0    mins  70643;    Therapeutic Activity:     0     mins  83138;     Gait Trainin     mins  46705;     Ultrasound:     0     mins  53362;    Ionto                               0    mins   27122        Timed Treatment:   30   mins   Total Treatment:     30   mins    Carmen Baker PT  [place] License: [id number]

## 2022-09-15 ENCOUNTER — TREATMENT (OUTPATIENT)
Dept: PHYSICAL THERAPY | Facility: CLINIC | Age: 67
End: 2022-09-15

## 2022-09-15 DIAGNOSIS — Z74.09 IMPAIRED MOBILITY AND ACTIVITIES OF DAILY LIVING: ICD-10-CM

## 2022-09-15 DIAGNOSIS — M53.3 SACROILIAC DYSFUNCTION: ICD-10-CM

## 2022-09-15 DIAGNOSIS — G89.29 CHRONIC BILATERAL LOW BACK PAIN WITHOUT SCIATICA: Primary | ICD-10-CM

## 2022-09-15 DIAGNOSIS — Z78.9 IMPAIRED MOBILITY AND ACTIVITIES OF DAILY LIVING: ICD-10-CM

## 2022-09-15 DIAGNOSIS — M54.50 CHRONIC BILATERAL LOW BACK PAIN WITHOUT SCIATICA: Primary | ICD-10-CM

## 2022-09-15 PROCEDURE — 97110 THERAPEUTIC EXERCISES: CPT | Performed by: PHYSICAL THERAPIST

## 2022-09-15 PROCEDURE — 97140 MANUAL THERAPY 1/> REGIONS: CPT | Performed by: PHYSICAL THERAPIST

## 2022-09-15 NOTE — PROGRESS NOTES
Physical Therapy Daily Treatment Note      Patient: Marychuy De Paz   : 1955  Referring practitioner: Deejay Ford MD  Date of Initial Visit: Type: THERAPY  Noted: 2022  Today's Date: 9/15/2022  Patient seen for 3 sessions       Visit Diagnoses:    ICD-10-CM ICD-9-CM   1. Chronic bilateral low back pain without sciatica  M54.50 724.2    G89.29 338.29   2. Sacroiliac dysfunction  M53.3 724.6   3. Impaired mobility and activities of daily living  Z74.09 V49.89    Z78.9        Subjective   Not having stabbing low back pain. Both hips are sore.    Objective   See Exercise, Manual, and Modality Logs for complete treatment.       Assessment/Plan  Presented w/ slight leg length discrepancy and tenderness of pelvic bony prominences. Responding well to MET and stability exercises. Required verbal cues for ab bracing w/ mini squat. Progress per POC.      Timed:         Manual Therapy:    10     mins  96804;     Therapeutic Exercise:    20     mins  61940;     Neuromuscular Suki:    0    mins  69683;    Therapeutic Activity:     0     mins  91055;     Gait Trainin     mins  68871;     Ultrasound:     0     mins  15715;    Ionto                               0    mins   55282        Timed Treatment:   30   mins   Total Treatment:     30   mins    Carmen Baker, PT  [place] License: [id number]

## 2022-09-20 ENCOUNTER — TREATMENT (OUTPATIENT)
Dept: PHYSICAL THERAPY | Facility: CLINIC | Age: 67
End: 2022-09-20

## 2022-09-20 DIAGNOSIS — Z78.9 IMPAIRED MOBILITY AND ACTIVITIES OF DAILY LIVING: ICD-10-CM

## 2022-09-20 DIAGNOSIS — M54.50 CHRONIC BILATERAL LOW BACK PAIN WITHOUT SCIATICA: Primary | ICD-10-CM

## 2022-09-20 DIAGNOSIS — Z74.09 IMPAIRED MOBILITY AND ACTIVITIES OF DAILY LIVING: ICD-10-CM

## 2022-09-20 DIAGNOSIS — M53.3 SACROILIAC DYSFUNCTION: ICD-10-CM

## 2022-09-20 DIAGNOSIS — G89.29 CHRONIC BILATERAL LOW BACK PAIN WITHOUT SCIATICA: Primary | ICD-10-CM

## 2022-09-20 PROCEDURE — 97110 THERAPEUTIC EXERCISES: CPT | Performed by: PHYSICAL THERAPIST

## 2022-09-20 PROCEDURE — 97140 MANUAL THERAPY 1/> REGIONS: CPT | Performed by: PHYSICAL THERAPIST

## 2022-09-20 NOTE — PROGRESS NOTES
Physical Therapy Daily Treatment Note      Patient: Marychuy De Paz   : 1955  Referring practitioner: Deejay Ford MD  Date of Initial Visit: Type: THERAPY  Noted: 2022  Today's Date: 2022  Patient seen for 4 sessions       Visit Diagnoses:    ICD-10-CM ICD-9-CM   1. Chronic bilateral low back pain without sciatica  M54.50 724.2    G89.29 338.29   2. Sacroiliac dysfunction  M53.3 724.6   3. Impaired mobility and activities of daily living  Z74.09 V49.89    Z78.9        Subjective   B lumbar facet joint injections yesterday. No pain on left side. I have dull pain in region of R SIJ. Rates pain 3/10    Objective   TTP L5 SP, B SIJs    See Exercise, Manual, and Modality Logs for complete treatment.     Assessment/Plan  Continues to present w/ significant leg length discrepancy. Improves with R long axis distraction. Significant tenderness at bilateral SIJs. No relief with massage today. Added lateral stepping to stability program. Progress per POC.    Timed:         Manual Therapy:    20     mins  79280;     Therapeutic Exercise:    10     mins  39879;     Neuromuscular Suki:    0    mins  35638;    Therapeutic Activity:     0     mins  97817;     Gait Trainin     mins  46257;     Ultrasound:     0     mins  53972;    Ionto                               0    mins   65898        Timed Treatment:   30   mins   Total Treatment:     30   mins    Carmen Baker, PT  [place] License: [id number]

## 2022-09-27 ENCOUNTER — OFFICE VISIT (OUTPATIENT)
Dept: OBSTETRICS AND GYNECOLOGY | Facility: CLINIC | Age: 67
End: 2022-09-27

## 2022-09-27 VITALS
BODY MASS INDEX: 30.22 KG/M2 | HEIGHT: 65 IN | WEIGHT: 181.4 LBS | SYSTOLIC BLOOD PRESSURE: 138 MMHG | DIASTOLIC BLOOD PRESSURE: 88 MMHG

## 2022-09-27 DIAGNOSIS — Z01.419 WELL WOMAN EXAM: ICD-10-CM

## 2022-09-27 DIAGNOSIS — Z11.51 SCREENING FOR HPV (HUMAN PAPILLOMAVIRUS): ICD-10-CM

## 2022-09-27 DIAGNOSIS — Z01.419 PAP SMEAR, LOW-RISK: Primary | ICD-10-CM

## 2022-09-27 PROCEDURE — 99396 PREV VISIT EST AGE 40-64: CPT | Performed by: OBSTETRICS & GYNECOLOGY

## 2022-09-27 RX ORDER — LEVOCETIRIZINE DIHYDROCHLORIDE 5 MG/1
TABLET, FILM COATED ORAL
COMMUNITY
Start: 2022-07-19

## 2022-09-27 RX ORDER — CLOBETASOL PROPIONATE 0.5 MG/G
OINTMENT TOPICAL
Qty: 60 G | Refills: 1 | Status: SHIPPED | OUTPATIENT
Start: 2022-09-27 | End: 2022-09-30 | Stop reason: SDUPTHER

## 2022-09-27 RX ORDER — TIZANIDINE 4 MG/1
4 TABLET ORAL EVERY 8 HOURS PRN
COMMUNITY
Start: 2022-09-20

## 2022-09-27 NOTE — PROGRESS NOTES
GYN Annual Exam     CC- Here for annual exam.     Marychuy De Paz is a 66 y.o. female who presents for annual well woman exam. Pt had a FRANCIE/BSO in  due to large mass of uterus or ovary. The mass caused damage to her bladder and she has had 3 bladder surgeries.  She had mesh erosion and has had extensive surgery to remove it in 2019. She is having a lot of urinary urgency and frequency. She has never been on bladder meds until last year but she reports she did not like the Myrbetriq bc she had retention. She is now on a muscle relaxer due to her back and it is helpingwith her nocturia. She is using a pad during the day.  Was started on Prometrium but is now off of it.  She had been on estrogen, testosterone, progesterone in the past.  Patient using estrogen cream for dyspareunia 2x per week. MMG done 2021.    OB History        2    Para   2    Term   2            AB        Living           SAB        IAB        Ectopic        Molar        Multiple        Live Births                    Current contraception: status post hysterectomy  History of abnormal Pap smear: no  History of abnormal mammogram: yes - remote- cyst aspirated.  Family history of uterine, colon or ovarian cancer: no  Family history of breast cancer: yes - mother diagnosed at age 84    Health Maintenance   Topic Date Due   • ZOSTER VACCINE (1 of 2) Never done   • COVID-19 Vaccine (3 - Booster for Pfizer series) 2021   • Annual Gynecologic Pelvic and Breast Exam  09/15/2022   • INFLUENZA VACCINE  10/01/2022   • ANNUAL PHYSICAL  2023   • LIPID PANEL  2023   • MAMMOGRAM  12/15/2023   • PAP SMEAR  2024   • COLORECTAL CANCER SCREENING  2025   • TDAP/TD VACCINES (2 - Td or Tdap) 2032   • HEPATITIS C SCREENING  Completed   • Pneumococcal Vaccine 0-64  Aged Out       Past Medical History:   Diagnosis Date   • Anemia    • Breast cyst    • Disease of thyroid gland    • Diverticulosis of colon 2008   •  Enterocele    • Fall 1970    fractured Sacrum    • Fall 2009   • Female cystocele    • History of anemia    • History of blood clots     LT LEG X2 AFTER INJECTION FOR SPIDER VEINS   • History of transfusion 1977    AFTER PPH   • Hypertension    • Hypothyroidism    • Incarcerated hiatal hernia 09/20/2016   • Lichen planopilaris of vulva    • Rectocele    • Stress incontinence, female        Past Surgical History:   Procedure Laterality Date   • ANTERIOR AND POSTERIOR VAGINAL REPAIR N/A 2/22/2019    Procedure: POSTERIOR COLPORRHAPHY VAGINAL ENTEROCELE REPAIR;  Surgeon: Kaci Goodman MD;  Location: [facility] OR;  Service: Gynecology   • BLADDER REPAIR      x2   • BREAST CYST ASPIRATION     • BUNIONECTOMY Bilateral 1999   • COLONOSCOPY N/A 7/27/2022    Procedure: COLONOSCOPY to cecum with cold biopsy/hot snare polypectomies and clip x1;  Surgeon: Ajith Dudley MD;  Location: [facility] ENDOSCOPY;  Service: General;  Laterality: N/A;  pre- screening  post- colon polyps, melanosis coli   • HYSTERECTOMY      FULL   • OOPHORECTOMY     • TN LAP,ESOPHAGOGAST FUNDOPLASTY N/A 9/21/2016    Procedure: HIATAL HERNIA REPAIR LAPAROSCOPIC WITH MESH;  Surgeon: Donaldo Nuñez MD;  Location: [facility] OR;  Service: General   • PUBOVAGINAL SLING N/A 2/22/2019    Procedure: PUBO VAGINAL SLING REMOVAL AND REVISION OF SLING WITH CYSTOURETHROSCOPY;  Surgeon: Kaci Goodman MD;  Location: [facility] OR;  Service: Gynecology   • SHOULDER ROTATOR CUFF REPAIR Left 04/2014   • TONSILLECTOMY     • TONSILLECTOMY  09/01/1969   • UTEROSACRAL LIGAMENT TRANSECTION N/A 2/22/2019    Procedure: SACROSPINOUS LIGAMENT FIXATION EXTRAPERITONEAL COLPOPEXY, INSERTION OF VAGINAL GRAFT ANTERIOR AND POSTERIOR, PUBOVAGINAL SLING;  Surgeon: Kaci Goodman MD;  Location: [facility] OR;  Service: Gynecology         Current Outpatient Medications:   •  albuterol sulfate  (90 Base) MCG/ACT inhaler, Inhale 2 puffs Every 4 (Four) Hours As Needed  "for Wheezing., Disp: 18 g, Rfl: 1  •  amLODIPine (NORVASC) 5 MG tablet, TAKE 1 TABLET DAILY (Patient taking differently: Every Night.), Disp: 90 tablet, Rfl: 3  •  B-D ALLERGY SYRINGE 1CC/28G 28G X 1/2\" 1 ML misc, USE UTD, Disp: , Rfl:   •  Chlorphen-PSE-Atrop-Hyos-Scop (STAHIST PO), Take 1 tablet by mouth As Needed (ALLERGIES)., Disp: , Rfl:   •  cholecalciferol (VITAMIN D3) 25 MCG (1000 UT) tablet, Take 1,000 Units by mouth Daily., Disp: , Rfl:   •  clobetasol (TEMOVATE) 0.05 % ointment, Apply 2x per day x2 weeks then daily x 1 week, Disp: 60 g, Rfl: 1  •  EPINEPHrine (EPIPEN) 0.3 MG/0.3ML solution auto-injector injection, INJECT INTRAMUSCULARLY AS DIRECTED, Disp: , Rfl: 0  •  estradiol (ESTRACE) 0.1 MG/GM vaginal cream, INSERT 1 GRAM VAGINALLY TWICE A WEEK, Disp: 42.5 g, Rfl: 1  •  Lactobacillus (ULTIMATE PROBIOTIC FORMULA PO), Take 1 tablet by mouth Daily., Disp: , Rfl:   •  levocetirizine (XYZAL) 5 MG tablet, , Disp: , Rfl:   •  levothyroxine (SYNTHROID, LEVOTHROID) 50 MCG tablet, TAKE 1 TABLET DAILY, Disp: 90 tablet, Rfl: 3  •  liothyronine (CYTOMEL) 5 MCG tablet, TAKE 1 TABLET DAILY, Disp: 90 tablet, Rfl: 3  •  magnesium gluconate (MAGONATE) 500 MG tablet, Take 500 mg by mouth Daily. DUE TO STOP 7 DAYS PRIOR TO SURGERY, Disp: , Rfl:   •  Multiple Vitamins-Minerals (ENERGY BOOSTER PO), Take 1 tablet by mouth Daily. DUE TO STOP 7 DAYS PRIOR TO SURGERY, Disp: , Rfl:   •  NON FORMULARY, Inject 1 dose under the skin into the appropriate area as directed 2 (Two) Times a Week. Allergy shot, Disp: , Rfl:   •  tiZANidine (ZANAFLEX) 4 MG tablet, Take 4 mg by mouth Every 8 (Eight) Hours As Needed., Disp: , Rfl:   •  Triamcinolone Acetonide (NASACORT) 55 MCG/ACT nasal inhaler, 2 sprays into the nostril(s) as directed by provider Every Night., Disp: , Rfl:   •  valACYclovir (VALTREX) 500 MG tablet, TAKE 2 TABLETS BY MOUTH TWICE DAILY AT ONSET OF COLD SORES FOR 1 DAY, Disp: 8 tablet, Rfl: 1  •  ZyrTEC-D Allergy & " "Congestion 5-120 MG per 12 hr tablet, Take 1 tablet by mouth Daily., Disp: , Rfl:     Allergies   Allergen Reactions   • Pollen Extract Irritability       Social History     Tobacco Use   • Smoking status: Never Smoker   • Smokeless tobacco: Never Used   Vaping Use   • Vaping Use: Never used   Substance Use Topics   • Alcohol use: No   • Drug use: No       Family History   Problem Relation Age of Onset   • Breast cancer Mother    • Hypertension Mother    • Hypothyroidism Mother    • Lung cancer Father    • Hypertension Sister    • Kidney cancer Sister    • Tongue cancer Sister    • Diabetes Brother    • Prostate cancer Brother    • Breast cancer Maternal Aunt    • Breast cancer Maternal Aunt    • Breast cancer Maternal Aunt    • Malig Hyperthermia Neg Hx        Review of Systems   Constitutional: Negative for appetite change, chills, fatigue, fever and unexpected weight change.   Gastrointestinal: Negative for abdominal distention, abdominal pain, anal bleeding, blood in stool, constipation, diarrhea, nausea and vomiting.   Genitourinary: Positive for frequency. Negative for dyspareunia, dysuria, menstrual problem, pelvic pain, vaginal bleeding, vaginal discharge and vaginal pain.       /88   Ht 165.1 cm (65\")   Wt 82.3 kg (181 lb 6.4 oz)   LMP  (LMP Unknown)   BMI 30.19 kg/m²     Physical Exam  Vitals reviewed.   Constitutional:       General: She is not in acute distress.     Appearance: Normal appearance. She is well-developed. She is not ill-appearing, toxic-appearing or diaphoretic.   HENT:      Mouth/Throat:      Dentition: Normal dentition. No dental caries.   Cardiovascular:      Rate and Rhythm: Normal rate and regular rhythm.      Heart sounds: Normal heart sounds.   Pulmonary:      Effort: Pulmonary effort is normal. No respiratory distress.      Breath sounds: Normal breath sounds. No stridor. No wheezing.   Chest:   Breasts:      Right: No inverted nipple, mass, nipple discharge, skin change " or tenderness.      Left: No inverted nipple, mass, nipple discharge, skin change or tenderness.       Abdominal:      General: There is no distension.      Palpations: Abdomen is soft. There is no mass.      Tenderness: There is no abdominal tenderness.   Genitourinary:     General: Normal vulva.      Labia:         Right: Rash present. No tenderness or lesion.         Left: Rash present. No tenderness or lesion.       Urethra: No prolapse, urethral pain, urethral swelling or urethral lesion.      Vagina: Foreign body present. No vaginal discharge, tenderness or bleeding.      Cervix: No cervical motion tenderness, discharge or friability.      Uterus: Absent. Not deviated, not enlarged, not fixed and not tender.       Adnexa:         Right: No mass, tenderness or fullness.          Left: No mass, tenderness or fullness.        Rectum: No tenderness or external hemorrhoid.      Comments: Mesh erosion noted on left and right sides under urethra.  Erythema and white plaques consistent with lichen sclerosis  Musculoskeletal:         General: No tenderness. Normal range of motion.   Skin:     General: Skin is warm.      Findings: No erythema or rash.   Neurological:      General: No focal deficit present.      Mental Status: She is alert and oriented to person, place, and time. Mental status is at baseline.      Cranial Nerves: No cranial nerve deficit.      Motor: No weakness.      Coordination: Coordination normal.      Gait: Gait normal.   Psychiatric:         Mood and Affect: Mood normal.         Behavior: Behavior normal.         Thought Content: Thought content normal.         Judgment: Judgment normal.            Assessment/Plan      1) GYN HM: Check pap smear of vaginal cuff. SBE demonstrated and encouraged.  2) : s/p prometrium  3) Bone health - Weight bearing exercise, dietary calcium recommendations and vitamin D reviewed. Family hx of osteoporosis: mother. Check Bone density. Normal 2015.  4) Diet and  Exercise discussed  5) Smoking Status: nonsmoker  6) Social: Patient works in [employer] at [employer]- retiring 11/17/22  7) OAB sx, continuous leakage, nocturia: Patient has had extensive bladder surgery including removal of mesh and repair of her bladder.  Patient is still up several times at night with overactive bladder symptoms.  Oxybutinin caused urinary retention. Pt reports she is having less nocturia since starting on muscle relaxer for her back.  8) /mesh erosion: Using estrogen cream 2-3 times a week to help with the erosion. Pt is not having much intercourse anymore.  9) Lichen sclerosis: Rx Clobetasol bid x 2 weeks then daily x 1 week. Pt to call if sx do not improve.    10) Follow up prn and 1 year       Diagnoses and all orders for this visit:    Pap smear, low-risk  -     IgP, Aptima HPV    Well woman exam  -     POC Urinalysis Dipstick  -     IgP, Aptima HPV    Screening for HPV (human papillomavirus)  -     IgP, Aptima HPV    Other orders  -     levocetirizine (XYZAL) 5 MG tablet  -     tiZANidine (ZANAFLEX) 4 MG tablet; Take 4 mg by mouth Every 8 (Eight) Hours As Needed.  -     clobetasol (TEMOVATE) 0.05 % ointment; Apply 2x per day x2 weeks then daily x 1 week        Amy Leonardo DO  9/27/2022  09:45 EDT

## 2022-10-04 ENCOUNTER — TREATMENT (OUTPATIENT)
Dept: PHYSICAL THERAPY | Facility: CLINIC | Age: 67
End: 2022-10-04

## 2022-10-04 DIAGNOSIS — M53.3 SACROILIAC DYSFUNCTION: ICD-10-CM

## 2022-10-04 DIAGNOSIS — Z74.09 IMPAIRED MOBILITY AND ACTIVITIES OF DAILY LIVING: ICD-10-CM

## 2022-10-04 DIAGNOSIS — M54.50 CHRONIC BILATERAL LOW BACK PAIN WITHOUT SCIATICA: Primary | ICD-10-CM

## 2022-10-04 DIAGNOSIS — Z78.9 IMPAIRED MOBILITY AND ACTIVITIES OF DAILY LIVING: ICD-10-CM

## 2022-10-04 DIAGNOSIS — G89.29 CHRONIC BILATERAL LOW BACK PAIN WITHOUT SCIATICA: Primary | ICD-10-CM

## 2022-10-04 LAB
CYTOLOGIST CVX/VAG CYTO: NORMAL
CYTOLOGY CVX/VAG DOC CYTO: NORMAL
CYTOLOGY CVX/VAG DOC THIN PREP: NORMAL
DX ICD CODE: NORMAL
HIV 1 & 2 AB SER-IMP: NORMAL
HPV I/H RISK 4 DNA CVX QL PROBE+SIG AMP: NEGATIVE
OTHER STN SPEC: NORMAL
STAT OF ADQ CVX/VAG CYTO-IMP: NORMAL

## 2022-10-04 PROCEDURE — 97110 THERAPEUTIC EXERCISES: CPT | Performed by: PHYSICAL THERAPIST

## 2022-10-04 PROCEDURE — 97140 MANUAL THERAPY 1/> REGIONS: CPT | Performed by: PHYSICAL THERAPIST

## 2022-10-04 RX ORDER — CLOBETASOL PROPIONATE 0.5 MG/G
OINTMENT TOPICAL
Qty: 60 G | Refills: 1 | Status: SHIPPED | OUTPATIENT
Start: 2022-10-04 | End: 2023-01-20 | Stop reason: SDUPTHER

## 2022-10-04 RX ORDER — ESTRADIOL 0.1 MG/G
CREAM VAGINAL
Qty: 42.5 G | Refills: 1 | Status: SHIPPED | OUTPATIENT
Start: 2022-10-04

## 2022-10-04 NOTE — PROGRESS NOTES
Physical Therapy Daily Treatment Note      Patient: Marychuy De Paz   : 1955  Referring practitioner: No ref. provider found  Date of Initial Visit: Type: THERAPY  Noted: 2022  Today's Date: 10/4/2022  Patient seen for 5 sessions       Visit Diagnoses:    ICD-10-CM ICD-9-CM   1. Chronic bilateral low back pain without sciatica  M54.50 724.2    G89.29 338.29   2. Sacroiliac dysfunction  M53.3 724.6   3. Impaired mobility and activities of daily living  Z74.09 V49.89    Z78.9        Subjective   Was doing home exercise (BKFO) Monday when I felt a pop in my back. No longer having L sided pain. Now I have pain in R low back. One instance of sharp pain in L low back when transferring patient today.    Objective   TTP L gluteus medius, piriformis  ASIS, ischial tuberosities level    See Exercise, Manual, and Modality Logs for complete treatment.     Assessment/Plan  Presented w/ level pelvis. Limited anterior hip capsule mobility/PROM ER bilaterally. Reported bilateral low back pain w/ resisted side stepping. Tolerated all other exercise well. Progress per POC.    Timed:         Manual Therapy:    10     mins  28963;     Therapeutic Exercise:    25     mins  38445;     Neuromuscular Suki:    0    mins  08721;    Therapeutic Activity:     0     mins  47652;     Gait Trainin     mins  60067;     Ultrasound:     0     mins  93800;    Ionto                               0    mins   88321        Timed Treatment:   35   mins   Total Treatment:     35   mins    Carmen Baker, PT  [place] License: [id number]

## 2022-10-06 ENCOUNTER — TREATMENT (OUTPATIENT)
Dept: PHYSICAL THERAPY | Facility: CLINIC | Age: 67
End: 2022-10-06

## 2022-10-06 DIAGNOSIS — M53.3 SACROILIAC DYSFUNCTION: ICD-10-CM

## 2022-10-06 DIAGNOSIS — G89.29 CHRONIC BILATERAL LOW BACK PAIN WITHOUT SCIATICA: Primary | ICD-10-CM

## 2022-10-06 DIAGNOSIS — M54.50 CHRONIC BILATERAL LOW BACK PAIN WITHOUT SCIATICA: Primary | ICD-10-CM

## 2022-10-06 DIAGNOSIS — Z74.09 IMPAIRED MOBILITY AND ACTIVITIES OF DAILY LIVING: ICD-10-CM

## 2022-10-06 DIAGNOSIS — Z78.9 IMPAIRED MOBILITY AND ACTIVITIES OF DAILY LIVING: ICD-10-CM

## 2022-10-06 PROCEDURE — 97530 THERAPEUTIC ACTIVITIES: CPT | Performed by: PHYSICAL THERAPIST

## 2022-10-06 PROCEDURE — 97140 MANUAL THERAPY 1/> REGIONS: CPT | Performed by: PHYSICAL THERAPIST

## 2022-10-06 NOTE — PROGRESS NOTES
Re-Assessment / Re-Certification        Patient: Marychuy De Paz   : 1955  Diagnosis/ICD-10 Code:  Chronic bilateral low back pain without sciatica [M54.50, G89.29]  Referring practitioner: No ref. provider found  Date of Initial Evaluation:  Type: THERAPY  Noted: 2022  Patient seen for 6 sessions      Subjective:   Marychuy De Paz reports: Woke up at 3:15AM to go to the bathroom. I could not get comfortable due to R low back pain, so I did PT exercises. Exercises hurt. Describes burn with knee to opposite shoulder stretch. BKFO hurts sometimes, not every time. Pain with resisted lateral stepping. I have SI belt on now, and that helps. Rates pain 3/10.  Subjective Questionnaire: Oswestry: 38/100  Clinical Progress: unchanged  Home Program Compliance: Yes  Treatment has included: therapeutic exercise, manual therapy and therapeutic activity    Subjective   Objective   Palpation   Left   Tenderness of the gluteus medius and piriformis.      Right Tenderness of the gluteus medius and piriformis.      Tenderness      Lumbar Spine  Tenderness in the spinous process (L3-S1).      Left Hip   Tenderness in the ischial tuberosity and sacroiliac joint.      Right Hip   Tenderness in the ischial tuberosity and sacroiliac joint.        Active Range of Motion      Lumbar   Flexion: Active lumbar flexion: L lumbar returning to standing. WFL and with pain  Extension: WFL  Left lateral flexion: WFL and with pain (L posterior hip)  Right lateral flexion: WFL and with pain (L posterior hip)  Left rotation: WFL  Right rotation: WFL     Strength/Myotome Testing      Left Hip   Planes of Motion   Flexion: 4+     Right Hip   Planes of Motion   Flexion: 4+      Tests    Negative slump       Additional Tests Details  L leg long  L ASIS TTP  L ASIS inferior  + MARTHA L for L LBP  L ischial tuberosity superior      Assessment & Plan   Goals  Plan Goals: Short Term Goals: 4 weeks. Patient will:  1. Be independent with initial HEP  MET  2. Be instructed in posture and body mechanics MET  3. Present w/ level pelvis, indicating improved lumbopelvic stability w/ ADLs IMPROVING  4. Report pain of </= 5/10 with daily activities MET    Long Term Goals: 6-12 weeks. Patient will:  1. Demonstrate lower extremity flexibility and lumbar ROM WFL to allow for return to household & recreational activities w/o increased symptoms  2. Report pain of </= 2/10 with all daily activities.  3. Perceived disability </= 10% as measured by Oswestry Questionnaire.  4. Be independent with long term HEP  ALL NOT MET    Assessment/Plan  Progress toward previous goals: Partially Met      Recommendations: Continue as planned  Timeframe: 2 months  Prognosis to achieve goals: good    PT Signature: Carmen Baker, PT      Based upon review of the patient's progress and continued therapy plan, it is my medical opinion that Marychuy De Paz should continue physical therapy treatment at [facility] PH[facility]  [address] [place] [id number] [phone].    Signature: __________________________________      Manual Therapy:    25     mins  17669;  Therapeutic Exercise:    5     mins  18067;     Neuromuscular Suki:    0    mins  68992;    Therapeutic Activity:     10     mins  89585;     Gait Trainin     mins  63017;     Ultrasound:     0     mins  20995;    Work Hardening           0      mins 06554  Iontophoresis               0   mins 55093    Timed Treatment:   40   mins   Total Treatment:     40   mins

## 2022-10-20 ENCOUNTER — TREATMENT (OUTPATIENT)
Dept: PHYSICAL THERAPY | Facility: CLINIC | Age: 67
End: 2022-10-20

## 2022-10-20 DIAGNOSIS — Z74.09 IMPAIRED MOBILITY AND ACTIVITIES OF DAILY LIVING: ICD-10-CM

## 2022-10-20 DIAGNOSIS — M54.50 CHRONIC BILATERAL LOW BACK PAIN WITHOUT SCIATICA: Primary | ICD-10-CM

## 2022-10-20 DIAGNOSIS — Z78.9 IMPAIRED MOBILITY AND ACTIVITIES OF DAILY LIVING: ICD-10-CM

## 2022-10-20 DIAGNOSIS — M53.3 SACROILIAC DYSFUNCTION: ICD-10-CM

## 2022-10-20 DIAGNOSIS — G89.29 CHRONIC BILATERAL LOW BACK PAIN WITHOUT SCIATICA: Primary | ICD-10-CM

## 2022-10-20 PROCEDURE — 97140 MANUAL THERAPY 1/> REGIONS: CPT | Performed by: PHYSICAL THERAPIST

## 2022-10-20 PROCEDURE — 97110 THERAPEUTIC EXERCISES: CPT | Performed by: PHYSICAL THERAPIST

## 2022-10-20 NOTE — PROGRESS NOTES
Discharge Summary  Discharge Summary from Physical Therapy Report    Patient Information  Marychuy De Paz  1955    Dates  PT visit: 9/9/22-10-20/22  Number of Visits: 7     Discharge Status of Patient: See MD Note dated 10/6/22    Goals: Partially Met    Visit Diagnoses:    ICD-10-CM ICD-9-CM   1. Chronic bilateral low back pain without sciatica  M54.50 724.2    G89.29 338.29   2. Sacroiliac dysfunction  M53.3 724.6   3. Impaired mobility and activities of daily living  Z74.09 V49.89    Z78.9        Discharge Plan: Continue with current home exercise program as instructed    Comments Plan for ablation Monday; self-DC to [facility]    Date of Discharge 10/20/22        Carmen Baker, PT  Physical Therapist

## 2022-10-20 NOTE — PROGRESS NOTES
Physical Therapy Daily Treatment Note      Patient: Marychuy De Paz   : 1955  Referring practitioner: No ref. provider found  Date of Initial Visit: Type: THERAPY  Noted: 2022  Today's Date: 10/20/2022  Patient seen for 7 sessions       Visit Diagnoses:    ICD-10-CM ICD-9-CM   1. Chronic bilateral low back pain without sciatica  M54.50 724.2    G89.29 338.29   2. Sacroiliac dysfunction  M53.3 724.6   3. Impaired mobility and activities of daily living  Z74.09 V49.89    Z78.9        Subjective   Feeling pretty good. I have more R than L low back pain today. Rates pain 2/10. Plan for nerve ablation Monday. 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 Does not plan to schedule more PT at this time.    Objective   See Exercise, Manual, and Modality Logs for complete treatment.     Assessment/Plan  Continues to present w/ significant leg length discrepancy (L>R). Discrepancy improves w/ R LE long axis traction; I do not expect complete resolution given scoliosis. Continues to have significant tenderness of B posterior hips. Reports pain relief with massage.   Plan to DC to [facility] at this time.    Timed:         Manual Therapy:    20     mins  43993;     Therapeutic Exercise:    10     mins  53561;     Neuromuscular Suki:    0    mins  61273;    Therapeutic Activity:     0     mins  99976;     Gait Trainin     mins  84209;     Ultrasound:     0     mins  31917;    Ionto                               0    mins   44671        Timed Treatment:   30   mins   Total Treatment:     30   mins    Carmen Baker, PT  [place] License: [id number]

## 2022-10-25 ENCOUNTER — TRANSCRIBE ORDERS (OUTPATIENT)
Dept: ADMINISTRATIVE | Facility: HOSPITAL | Age: 67
End: 2022-10-25

## 2022-10-25 DIAGNOSIS — Z12.31 SCREENING MAMMOGRAM, ENCOUNTER FOR: Primary | ICD-10-CM

## 2022-11-09 ENCOUNTER — TELEPHONE (OUTPATIENT)
Dept: FAMILY MEDICINE CLINIC | Facility: CLINIC | Age: 67
End: 2022-11-09

## 2022-11-09 NOTE — TELEPHONE ENCOUNTER
Caller: Marychuy De Paz    Relationship: Self    Best call back number: [phone]    What is the best time to reach you:  ANYTIME    Who are you requesting to speak with (clinical staff, provider,  specific staff member):     What was the call regarding: PATIENT STATES SHE HAS BEEN ON HER MY CHART VIDEO VISIT SINCE 11:00 AM AND IS WANTING TO KNOW IF DR. EHSAN HUMPHREY IS RUNNING BEHIND.     PLEASE CALL PATIENT BACK AS SOON AS POSSIBLE.

## 2022-11-11 ENCOUNTER — TELEPHONE (OUTPATIENT)
Dept: FAMILY MEDICINE CLINIC | Facility: CLINIC | Age: 67
End: 2022-11-11

## 2022-11-11 NOTE — TELEPHONE ENCOUNTER
PT CALLED IN AND WANTED TO LET DR HUMPHREY KNOW THAT PT IS RETIRING AT THE END OF THE YEAR AND SHE WILL HAVE MEDICARE AND A SUPPLEMENT STARTING January 1ST. PT STATES DUE TO HER DRUG COVERAGE SHE WILL NEED TO CHANGE HER PHARMACY COME THE FIRST OF THE YEAR TO [facility]. PT WANTS TO KEEP CURRENT PHARMACY UNTIL THE FIRST OF THE YEAR.    THANK YOU.

## 2022-11-14 ENCOUNTER — TELEMEDICINE (OUTPATIENT)
Dept: FAMILY MEDICINE CLINIC | Facility: CLINIC | Age: 67
End: 2022-11-14

## 2022-11-14 DIAGNOSIS — E53.8 B12 DEFICIENCY: ICD-10-CM

## 2022-11-14 DIAGNOSIS — E03.9 ACQUIRED HYPOTHYROIDISM: Primary | ICD-10-CM

## 2022-11-14 PROCEDURE — 99213 OFFICE O/P EST LOW 20 MIN: CPT | Performed by: FAMILY MEDICINE

## 2022-11-14 RX ORDER — LIOTHYRONINE SODIUM 5 UG/1
5 TABLET ORAL DAILY
Qty: 30 TABLET | Refills: 0 | Status: SHIPPED | OUTPATIENT
Start: 2022-11-14 | End: 2022-12-27

## 2022-11-14 RX ORDER — LEVOTHYROXINE SODIUM 0.05 MG/1
50 TABLET ORAL DAILY
Qty: 30 TABLET | Refills: 0 | Status: SHIPPED | OUTPATIENT
Start: 2022-11-14 | End: 2022-12-27

## 2022-11-14 NOTE — PROGRESS NOTES
"Chief Complaint  Hypothyroidism    Subjective        Marychuy De Paz presents to [facility]  History of Present Illness  She had recent labs for insurance.   She said her albumin and globulin were low.   Going to send to me.   Had urine as well.     Had colonoscopy. She had 5 TAs and recommended for follow up in 3 years.     She is taking B12.  Had B12 done for insurance.     No covid boosters.     She says she had lumbar ablation on both sides with ortho. Says she got an MRI and from there the did epidurals. She had ablation approved. Says was about 3 weeks to kick in.   Now she is sleeping some and was hardly getting 2 hours a night. Says worse in lying position.   She is doing a lot better.     Objective   Vital Signs:  There were no vitals taken for this visit.  Estimated body mass index is 30.19 kg/m² as calculated from the following:    Height as of 9/27/22: 165.1 cm (65\").    Weight as of 9/27/22: 82.3 kg (181 lb 6.4 oz).      Physical Exam  Constitutional:       General: She is not in acute distress.     Appearance: Normal appearance. She is not ill-appearing or toxic-appearing.   Eyes:      General: No scleral icterus.     Conjunctiva/sclera: Conjunctivae normal.   Pulmonary:      Effort: Pulmonary effort is normal. No respiratory distress.   Neurological:      Mental Status: She is alert and oriented to person, place, and time. Mental status is at baseline.   Psychiatric:         Mood and Affect: Mood normal.         Behavior: Behavior normal.        Result Review :                Assessment and Plan   Diagnoses and all orders for this visit:    1. Acquired hypothyroidism (Primary)    2. B12 deficiency    Other orders  -     levothyroxine (SYNTHROID, LEVOTHROID) 50 MCG tablet; Take 1 tablet by mouth Daily.  Dispense: 30 tablet; Refill: 0  -     liothyronine (CYTOMEL) 5 MCG tablet; Take 1 tablet by mouth Daily.  Dispense: 30 tablet; Refill: 0             Follow Up   No " follow-ups on file.  Patient was given instructions and counseling regarding her condition or for health maintenance advice. Please see specific information pulled into the AVS if appropriate.     Thyroid labs all normal last check about 6 months ago. Refills due. She will call back in January and we will do refill to her [employer] in [facility] related to change to medicare.   She is doing well. Better since ablation. Sleeping well. Activity better.   Will include T3 next time pt is on liothyronine.     Her B12 was checked with insurance check and says it was in normal range. She is taking oral B12 daily.   Going to send me results.     She says her albumin and globulin were low. Going to send me results on her labs to see if any further studies needed at this time.   She has protein in her diet. She says good at eating protein. Adequate diet, no weight loss. No protein in the urine.     Her hemoglobin was down with last labs but she had colonoscopy since then. I think ok to check in on blood again at one year point.    Patient gave consent today for a telehealth video visit as following recommendations of our governor and CDC during the COVID-19 pandemic.  Pt and provider in respective homes in [place] at time of visit.   15 min was spent in discussion with pt and greater than 50% of that time was spent counseling.

## 2022-11-17 DIAGNOSIS — R77.0 LOW SERUM ALBUMIN: Primary | ICD-10-CM

## 2022-12-15 ENCOUNTER — OFFICE VISIT (OUTPATIENT)
Dept: OBSTETRICS AND GYNECOLOGY | Facility: CLINIC | Age: 67
End: 2022-12-15

## 2022-12-15 VITALS
HEIGHT: 65 IN | WEIGHT: 184.96 LBS | SYSTOLIC BLOOD PRESSURE: 124 MMHG | BODY MASS INDEX: 30.82 KG/M2 | DIASTOLIC BLOOD PRESSURE: 76 MMHG

## 2022-12-15 DIAGNOSIS — N89.8 VAGINAL LESION: Primary | ICD-10-CM

## 2022-12-15 PROCEDURE — 56605 BIOPSY OF VULVA/PERINEUM: CPT | Performed by: OBSTETRICS & GYNECOLOGY

## 2022-12-15 PROCEDURE — 99213 OFFICE O/P EST LOW 20 MIN: CPT | Performed by: OBSTETRICS & GYNECOLOGY

## 2022-12-15 NOTE — PROGRESS NOTES
"PROBLEM VISIT    Chief Complaint:      Marychuy De Paz is a 67 y.o. patient who presents for follow up of lichen sclerosis. Pt was seen for her annual exam 9/2022. Pt was noted to have lichen sclerosis on exam. She was asymptomatic. She was started on Clobetasol bid x 2 weeks and then daily x 1 week. She states that after she used the cream she started to get symptoms of burning and itching. She started using the cream again 3 days after she stopped it and her symptoms are improved after 2 days and she stopped it 7 days ago and she is feeling better.   Chief Complaint   Patient presents with   • Vaginal Pain     PT reports is feeling better             The following portions of the patient's history were reviewed and updated as appropriate: allergies, current medications and problem list.    Review of Systems   Constitutional: Negative for appetite change, chills, fatigue, fever and unexpected weight change.   Gastrointestinal: Negative for abdominal distention, abdominal pain, anal bleeding, blood in stool, constipation, diarrhea, nausea and vomiting.   Genitourinary: Positive for vaginal pain. Negative for dyspareunia, dysuria, menstrual problem, pelvic pain, vaginal bleeding and vaginal discharge.       /76   Ht 165.1 cm (65\")   Wt 83.9 kg (184 lb 15.4 oz)   LMP  (LMP Unknown)   BMI 30.78 kg/m²     Physical Exam  Vitals reviewed.   Constitutional:       General: She is not in acute distress.     Appearance: Normal appearance. She is well-developed. She is not ill-appearing, toxic-appearing or diaphoretic.   Genitourinary:     General: Normal vulva.      Labia:         Right: No rash, tenderness, lesion or injury.         Left: No rash, tenderness, lesion or injury.       Vagina: No signs of injury and foreign body. Lesions present. No vaginal discharge, erythema, tenderness or bleeding.      Cervix: No cervical motion tenderness, discharge or friability.      Comments: White plaque noted at right " perineum  Skin:     General: Skin is warm.      Coloration: Skin is not pale.      Findings: No erythema or rash.   Neurological:      General: No focal deficit present.      Mental Status: She is alert and oriented to person, place, and time. Mental status is at baseline.      Cranial Nerves: No cranial nerve deficit.      Motor: No weakness.      Coordination: Coordination normal.      Gait: Gait normal.   Psychiatric:         Mood and Affect: Mood normal.         Behavior: Behavior normal.         Thought Content: Thought content normal.         Judgment: Judgment normal.           Assessment & Plan   Diagnoses and all orders for this visit:    1. Vaginal lesion (Primary)    68yo with vaginal lesion    Pt being treated for lichen sclerosis and the vaginal area looks significantly better.  An area of hypopigmentation of discomfort persists on the perineum.  The area was prepped and lidocaine was applied for anesthetic.  A punch biopsy was performed.  The specimen was sent to pathology.  With the pathology returned back as lichen sclerosis and will treat aggressively again just in this area.               No follow-ups on file.      Amy Leonardo DO    12/15/2022  10:32 EST

## 2022-12-19 ENCOUNTER — HOSPITAL ENCOUNTER (OUTPATIENT)
Dept: MAMMOGRAPHY | Facility: HOSPITAL | Age: 67
Discharge: HOME | End: 2022-12-19
Admitting: FAMILY MEDICINE

## 2022-12-19 DIAGNOSIS — Z12.31 SCREENING MAMMOGRAM, ENCOUNTER FOR: ICD-10-CM

## 2022-12-19 LAB
DX ICD CODE: NORMAL
DX ICD CODE: NORMAL
PATH REPORT.FINAL DX SPEC: NORMAL
PATH REPORT.GROSS SPEC: NORMAL
PATH REPORT.SITE OF ORIGIN SPEC: NORMAL
PATHOLOGIST NAME: NORMAL
PAYMENT PROCEDURE: NORMAL

## 2022-12-19 PROCEDURE — 77063 BREAST TOMOSYNTHESIS BI: CPT

## 2022-12-19 PROCEDURE — 77067 SCR MAMMO BI INCL CAD: CPT

## 2022-12-21 ENCOUNTER — TELEPHONE (OUTPATIENT)
Dept: OBSTETRICS AND GYNECOLOGY | Facility: CLINIC | Age: 67
End: 2022-12-21

## 2022-12-27 RX ORDER — LIOTHYRONINE SODIUM 5 UG/1
5 TABLET ORAL DAILY
Qty: 90 TABLET | Refills: 3 | Status: SHIPPED | OUTPATIENT
Start: 2022-12-27 | End: 2022-12-28 | Stop reason: SDUPTHER

## 2022-12-27 RX ORDER — LEVOTHYROXINE SODIUM 0.05 MG/1
50 TABLET ORAL DAILY
Qty: 90 TABLET | Refills: 3 | Status: SHIPPED | OUTPATIENT
Start: 2022-12-27 | End: 2022-12-28 | Stop reason: SDUPTHER

## 2022-12-27 NOTE — TELEPHONE ENCOUNTER
Rx Refill Note  Requested Prescriptions     Pending Prescriptions Disp Refills   • liothyronine (CYTOMEL) 5 MCG tablet [Pharmacy Med Name: LIOTHYRONINE 5MCG TABLETS] 30 tablet 0     Sig: TAKE 1 TABLET BY MOUTH DAILY   • levothyroxine (SYNTHROID, LEVOTHROID) 50 MCG tablet [Pharmacy Med Name: LEVOTHYROXINE 0.05MG (50MCG) TAB] 30 tablet 0     Sig: TAKE 1 TABLET BY MOUTH DAILY      Last office visit with prescribing clinician: 4/11/2022   Last telemedicine visit with prescribing clinician: Visit date not found   Next office visit with prescribing clinician: Visit date not found                         Would you like a call back once the refill request has been completed: [] Yes [] No    If the office needs to give you a call back, can they leave a voicemail: [] Yes [] No    Iman Chatterjee Rep  12/27/22, 08:26 EST

## 2022-12-29 RX ORDER — LIOTHYRONINE SODIUM 5 UG/1
5 TABLET ORAL DAILY
Qty: 90 TABLET | Refills: 3 | Status: SHIPPED | OUTPATIENT
Start: 2022-12-29 | End: 2023-01-03 | Stop reason: SDUPTHER

## 2022-12-29 RX ORDER — LEVOTHYROXINE SODIUM 0.05 MG/1
50 TABLET ORAL DAILY
Qty: 90 TABLET | Refills: 3 | Status: SHIPPED | OUTPATIENT
Start: 2022-12-29 | End: 2023-01-03 | Stop reason: SDUPTHER

## 2022-12-31 ENCOUNTER — PATIENT MESSAGE (OUTPATIENT)
Dept: FAMILY MEDICINE CLINIC | Facility: CLINIC | Age: 67
End: 2022-12-31
Payer: MEDICARE

## 2023-01-03 RX ORDER — LEVOTHYROXINE SODIUM 0.05 MG/1
50 TABLET ORAL DAILY
Qty: 90 TABLET | Refills: 3 | Status: SHIPPED | OUTPATIENT
Start: 2023-01-03

## 2023-01-03 RX ORDER — LIOTHYRONINE SODIUM 5 UG/1
5 TABLET ORAL DAILY
Qty: 90 TABLET | Refills: 3 | Status: SHIPPED | OUTPATIENT
Start: 2023-01-03

## 2023-01-03 NOTE — TELEPHONE ENCOUNTER
Rx Refill Note  Requested Prescriptions     Pending Prescriptions Disp Refills   • levothyroxine (SYNTHROID, LEVOTHROID) 50 MCG tablet 90 tablet 3     Sig: Take 1 tablet by mouth Daily.   • liothyronine (CYTOMEL) 5 MCG tablet 90 tablet 3     Sig: Take 1 tablet by mouth Daily.      Last office visit with prescribing clinician: 4/11/2022   Last telemedicine visit with prescribing clinician: Visit date not found   Next office visit with prescribing clinician: Visit date not found                         Would you like a call back once the refill request has been completed: [] Yes [] No    If the office needs to give you a call back, can they leave a voicemail: [] Yes [] No    Meghann Jenkins LPN  01/03/23, 16:30 EST

## 2023-01-03 NOTE — TELEPHONE ENCOUNTER
From: Marychuy De Paz  To: Alaina Licona  Sent: 12/31/2022 11:04 AM EST  Subject: Thyroid medications     I am completely out of both of my thyroid medications. Please call in the refills.  Thank you,  Marychuy De Paz

## 2023-01-04 ENCOUNTER — TELEPHONE (OUTPATIENT)
Dept: FAMILY MEDICINE CLINIC | Facility: CLINIC | Age: 68
End: 2023-01-04
Payer: MEDICARE

## 2023-01-10 RX ORDER — AMLODIPINE BESYLATE 5 MG/1
5 TABLET ORAL DAILY
Qty: 90 TABLET | Refills: 3 | Status: SHIPPED | OUTPATIENT
Start: 2023-01-10

## 2023-01-10 NOTE — TELEPHONE ENCOUNTER
Caller: [facility] [id number] [place] - [address] [facility] - [phone] [facility] [phone]    Relationship: Pharmacy    Best call back number: [phone]    Requested Prescriptions:   Requested Prescriptions     Pending Prescriptions Disp Refills   • amLODIPine (NORVASC) 5 MG tablet 90 tablet 3     Sig: Take 1 tablet by mouth Daily.        Pharmacy where request should be sent: [facility] [id number] - [place], [place] - [address] [facility] - [phone] [facility] [phone]       Does the patient have less than a 3 day supply:  [] Yes  [x] No    Simi Santos, RegSched Rep   01/10/23 13:50 EST

## 2023-01-20 ENCOUNTER — OFFICE VISIT (OUTPATIENT)
Dept: OBSTETRICS AND GYNECOLOGY | Facility: CLINIC | Age: 68
End: 2023-01-20
Payer: MEDICARE

## 2023-01-20 VITALS
HEIGHT: 65 IN | BODY MASS INDEX: 30.66 KG/M2 | WEIGHT: 184 LBS | DIASTOLIC BLOOD PRESSURE: 82 MMHG | SYSTOLIC BLOOD PRESSURE: 134 MMHG

## 2023-01-20 DIAGNOSIS — L30.9 DERMATITIS: ICD-10-CM

## 2023-01-20 DIAGNOSIS — L90.0 LICHEN SCLEROSUS: Primary | ICD-10-CM

## 2023-01-20 PROCEDURE — 99212 OFFICE O/P EST SF 10 MIN: CPT | Performed by: OBSTETRICS & GYNECOLOGY

## 2023-01-20 RX ORDER — CLOBETASOL PROPIONATE 0.5 MG/G
OINTMENT TOPICAL
Qty: 60 G | Refills: 1 | Status: SHIPPED | OUTPATIENT
Start: 2023-01-20 | End: 2023-01-24

## 2023-01-20 NOTE — PROGRESS NOTES
"PROBLEM VISIT    Chief Complaint: vulvar dermatitis/lichen sclerosis      Marychuy De Paz is a 67 y.o. patient who presents for follow up of vulvar dermatitis/lichen sclerosis. Pt had a biopsy and the diagnosis is not definitive but seems to be favoring a dermatitis. Pt states she is doing well. She has only had to use the Clobetasol a couple of times. Pt states that she is now noticing that these symptoms started when she changed the panty liner she was using. She is wondering if that caused the dermatitis. Pt has allergies and is on shots as well as Zyrtec and xyzal.    Chief Complaint   Patient presents with   • Follow-up             The following portions of the patient's history were reviewed and updated as appropriate: allergies, current medications and problem list.    Review of Systems   Constitutional: Negative for appetite change, chills, fatigue, fever and unexpected weight change.   Gastrointestinal: Negative for abdominal distention, abdominal pain, anal bleeding, blood in stool, constipation, diarrhea, nausea and vomiting.   Genitourinary: Negative for dyspareunia, dysuria, menstrual problem, pelvic pain, vaginal bleeding, vaginal discharge and vaginal pain.       /82   Ht 165.1 cm (65\")   Wt 83.5 kg (184 lb)   LMP  (LMP Unknown)   BMI 30.62 kg/m²     Physical Exam  Vitals reviewed.   Constitutional:       General: She is not in acute distress.     Appearance: Normal appearance. She is well-developed. She is not ill-appearing, toxic-appearing or diaphoretic.   Genitourinary:     General: Normal vulva.      Labia:         Right: No rash, tenderness, lesion or injury.         Left: No rash, tenderness, lesion or injury.       Vagina: No signs of injury and foreign body. No vaginal discharge, erythema, tenderness or bleeding.      Comments: Visually some areas appear in a lichen sclerosis pattern. No ulcerations or plaques like lesions. Improved from pervious exams.  Skin:     General: Skin is " warm.      Coloration: Skin is not pale.      Findings: No erythema or rash.   Neurological:      General: No focal deficit present.      Mental Status: She is alert and oriented to person, place, and time. Mental status is at baseline.      Cranial Nerves: No cranial nerve deficit.      Motor: No weakness.      Coordination: Coordination normal.      Gait: Gait normal.   Psychiatric:         Mood and Affect: Mood normal.         Behavior: Behavior normal.         Thought Content: Thought content normal.         Judgment: Judgment normal.           Assessment & Plan   Diagnoses and all orders for this visit:    1. Lichen sclerosus (Primary)    2. Dermatitis    Other orders  -     clobetasol (TEMOVATE) 0.05 % ointment; Apply 2x per day x2 weeks then daily x 1 week  Dispense: 60 g; Refill: 1      66yo with vulvar dermatitis/lichen sclerosis    Biopsy proven but not a definitive diagnosis. Since pt is feeling so much better we will not repeat biopsy. Discussed with pt that we may need another biopsy if symptoms return and cannot be controlled. Instructed to use Clobetasol sparingly 2-3 times per week when symptoms start. Pt to call if she has continued symptoms that do not resolve.             No follow-ups on file.      Amy Leonardo DO    1/24/2023  06:27 EST

## 2023-01-24 RX ORDER — CLOBETASOL PROPIONATE 0.5 MG/G
1 OINTMENT TOPICAL 2 TIMES DAILY
Qty: 60 G | Refills: 1 | Status: SHIPPED | OUTPATIENT
Start: 2023-01-24 | End: 2023-02-07

## 2023-11-15 ENCOUNTER — TRANSCRIBE ORDERS (OUTPATIENT)
Dept: ADMINISTRATIVE | Facility: HOSPITAL | Age: 68
End: 2023-11-15
Payer: MEDICARE

## 2023-11-15 DIAGNOSIS — Z12.31 ENCOUNTER FOR SCREENING MAMMOGRAM FOR BREAST CANCER: Primary | ICD-10-CM

## 2023-12-20 ENCOUNTER — HOSPITAL ENCOUNTER (OUTPATIENT)
Dept: MAMMOGRAPHY | Facility: HOSPITAL | Age: 68
Discharge: HOME | End: 2023-12-20
Admitting: NURSE PRACTITIONER
Payer: MEDICARE

## 2023-12-20 DIAGNOSIS — Z12.31 ENCOUNTER FOR SCREENING MAMMOGRAM FOR BREAST CANCER: ICD-10-CM

## 2023-12-20 PROCEDURE — 77063 BREAST TOMOSYNTHESIS BI: CPT

## 2023-12-20 PROCEDURE — 77067 SCR MAMMO BI INCL CAD: CPT

## 2024-04-29 PROBLEM — I83.023 VARICOSE VEINS OF LEFT LOWER EXTREMITY WITH ULCER OF ANKLE: Status: ACTIVE | Noted: 2024-04-29

## 2024-04-29 PROBLEM — Z04.9 CONDITION NOT FOUND: Status: ACTIVE | Noted: 2024-04-29

## 2024-04-29 PROBLEM — J30.2 SEASONAL ALLERGIES: Status: ACTIVE | Noted: 2024-04-29

## 2024-04-29 PROBLEM — L97.329 VARICOSE VEINS OF LEFT LOWER EXTREMITY WITH ULCER OF ANKLE: Status: ACTIVE | Noted: 2024-04-29

## 2024-05-18 NOTE — ADDENDUM NOTE
Addended by: RANDY ROBERTS on: 4/13/2018 12:21 PM     Modules accepted: Orders     [Time Spent: ___ minutes] : I have spent [unfilled] minutes of time on the encounter.

## 2024-11-11 ENCOUNTER — TRANSCRIBE ORDERS (OUTPATIENT)
Dept: ADMINISTRATIVE | Facility: HOSPITAL | Age: 69
End: 2024-11-11
Payer: MEDICARE

## 2024-11-11 DIAGNOSIS — Z12.31 BREAST CANCER SCREENING BY MAMMOGRAM: Primary | ICD-10-CM

## 2024-11-11 DIAGNOSIS — Z12.31 VISIT FOR SCREENING MAMMOGRAM: Primary | ICD-10-CM

## 2024-12-21 ENCOUNTER — HOSPITAL ENCOUNTER (OUTPATIENT)
Dept: MAMMOGRAPHY | Facility: HOSPITAL | Age: 69
Discharge: HOME | End: 2024-12-21
Admitting: NURSE PRACTITIONER
Payer: MEDICARE

## 2024-12-21 DIAGNOSIS — Z12.31 BREAST CANCER SCREENING BY MAMMOGRAM: ICD-10-CM

## 2024-12-21 PROCEDURE — 77067 SCR MAMMO BI INCL CAD: CPT

## 2024-12-21 PROCEDURE — 77063 BREAST TOMOSYNTHESIS BI: CPT

## (undated) DEVICE — SINGLE-USE BIOPSY FORCEPS: Brand: RADIAL JAW 4

## (undated) DEVICE — GLV SURG BIOGEL LTX PF 6 1/2

## (undated) DEVICE — DEV CAPTURE SUT CAPIO/SLIM 11MM 25CM

## (undated) DEVICE — SOL NACL 0.9PCT 1000ML

## (undated) DEVICE — LN SMPL CO2 SHTRM SD STREAM W/M LUER

## (undated) DEVICE — SUT VIC 0 CT1 36IN J946H

## (undated) DEVICE — PK HYST VAG 40

## (undated) DEVICE — SUT VIC 0 TIES 18IN J912G

## (undated) DEVICE — DRAPE,UNDERBUTTOCKS,PCH,STERILE: Brand: MEDLINE

## (undated) DEVICE — INTENT TO BE USED WITH SUTURE MATERIAL FOR TISSUE CLOSURE: Brand: RICHARD-ALLAN®  NEEDLE 1/2 CIRCLE REVERSE CUTTING

## (undated) DEVICE — SUT PROLN 0 CT2 MONO 30IN 8412H

## (undated) DEVICE — TUBING, SUCTION, 1/4" X 10', STRAIGHT: Brand: MEDLINE

## (undated) DEVICE — SENSR O2 OXIMAX FNGR A/ 18IN NONSTR

## (undated) DEVICE — MAGNETIC DRAPE: Brand: DEVON

## (undated) DEVICE — ANTIBACTERIAL UNDYED BRAIDED (POLYGLACTIN 910), SYNTHETIC ABSORBABLE SUTURE: Brand: COATED VICRYL

## (undated) DEVICE — CANN O2 ETCO2 FITS ALL CONN CO2 SMPL A/ 7IN DISP LF

## (undated) DEVICE — LEGGINGS, PAIR, CLEAR, STERILE: Brand: MEDLINE

## (undated) DEVICE — GOWN,SIRUS,NON REINFRCD,LARGE,SET IN SL: Brand: MEDLINE

## (undated) DEVICE — SUT PDS 0 CT2 27IN DYED Z334H

## (undated) DEVICE — DECANT BG O JET

## (undated) DEVICE — COVER,MAYO STAND,STERILE: Brand: MEDLINE

## (undated) DEVICE — IRRIGATOR BULB ASEPTO 60CC STRL

## (undated) DEVICE — SKIN PREP TRAY W/CHG: Brand: MEDLINE INDUSTRIES, INC.

## (undated) DEVICE — RETR STAY HK ELAS SHRP 5MM PK/8

## (undated) DEVICE — SUT PA DBL ARM TC22 T1/2CR 0 36IN

## (undated) DEVICE — TUBING, SUCTION, 1/4" X 20', STRAIGHT: Brand: MEDLINE INDUSTRIES, INC.

## (undated) DEVICE — SYR CONTRL LUERLOK 10CC

## (undated) DEVICE — RETR RNG GEN2 31.8X18.3CM

## (undated) DEVICE — TOTAL TRAY, 16FR 10ML SIL FOLEY, URN: Brand: MEDLINE

## (undated) DEVICE — ADAPT CLN BIOGUARD AIR/H2O DISP

## (undated) DEVICE — INTENDED FOR TISSUE SEPARATION, AND OTHER PROCEDURES THAT REQUIRE A SHARP SURGICAL BLADE TO PUNCTURE OR CUT.: Brand: BARD-PARKER ® CARBON RIB-BACK BLADES

## (undated) DEVICE — PUNCH BIOP 2MM

## (undated) DEVICE — NDL SPINE 18G 31/2IN PNK

## (undated) DEVICE — PATIENT RETURN ELECTRODE, SINGLE-USE, CONTACT QUALITY MONITORING, ADULT, WITH 9FT CORD, FOR PATIENTS WEIGING OVER 33LBS. (15KG): Brand: MEGADYNE

## (undated) DEVICE — DRAPE,REIN 53X77,STERILE: Brand: MEDLINE

## (undated) DEVICE — IRR EAR 2OZ

## (undated) DEVICE — THE SINGLE USE ETRAP – POLYP TRAP IS USED FOR SUCTION RETRIEVAL OF ENDOSCOPICALLY REMOVED POLYPS.: Brand: ETRAP

## (undated) DEVICE — SYR LL TP 10ML STRL

## (undated) DEVICE — IRRIGATOR TOOMEY 70CC

## (undated) DEVICE — SNAR POLYP SENSATION STDOVL 27 240 BX40

## (undated) DEVICE — UNDYED BRAIDED (POLYGLACTIN 910), SYNTHETIC ABSORBABLE SUTURE: Brand: COATED VICRYL

## (undated) DEVICE — KT ORCA ORCAPOD DISP STRL

## (undated) DEVICE — BANDAGE,GAUZE,BULKEE II,4.5"X4.1YD,STRL: Brand: MEDLINE

## (undated) DEVICE — 1010 S-DRAPE TOWEL DRAPE 10/BX: Brand: STERI-DRAPE™

## (undated) DEVICE — SYR LUERLOK 50ML

## (undated) DEVICE — ST IRR CYSTO W/SPK 77IN LF

## (undated) DEVICE — 3M™ STERI-DRAPE™ INSTRUMENT POUCH 1018: Brand: STERI-DRAPE™

## (undated) DEVICE — COVER,TABLE,44X90,STERILE: Brand: MEDLINE

## (undated) DEVICE — MAT FLR ABS LIQUILOC 28X56IN PNK